# Patient Record
Sex: MALE | Race: WHITE | NOT HISPANIC OR LATINO | Employment: FULL TIME | URBAN - METROPOLITAN AREA
[De-identification: names, ages, dates, MRNs, and addresses within clinical notes are randomized per-mention and may not be internally consistent; named-entity substitution may affect disease eponyms.]

---

## 2018-07-03 ENCOUNTER — LAB REQUISITION (OUTPATIENT)
Dept: LAB | Facility: HOSPITAL | Age: 43
End: 2018-07-03
Payer: COMMERCIAL

## 2018-07-03 DIAGNOSIS — N20.0 CALCULUS OF KIDNEY: ICD-10-CM

## 2018-07-03 PROCEDURE — 82360 CALCULUS ASSAY QUANT: CPT | Performed by: UROLOGY

## 2018-07-12 LAB
CA PHOS MFR STONE: 3 %
CALCIUM OXALATE DIHYDRATE MFR STONE IR: 20 %
COLOR STONE: NORMAL
COM MFR STONE: 77 %
COMMENT-STONE3: NORMAL
COMPOSITION: NORMAL
LABORATORY COMMENT REPORT: NORMAL
NIDUS STONE QL: NORMAL
PHOTO: NORMAL
SIZE STONE: NORMAL MM
STONE ANALYSIS-IMP: NORMAL
STONE ANALYSIS-IMP: NORMAL
SURFACE CRYSTALS: NORMAL
WT STONE: 3.4 MG

## 2019-01-16 ENCOUNTER — APPOINTMENT (OUTPATIENT)
Dept: RADIOLOGY | Facility: MEDICAL CENTER | Age: 44
End: 2019-01-16
Payer: COMMERCIAL

## 2019-01-16 ENCOUNTER — OFFICE VISIT (OUTPATIENT)
Dept: OBGYN CLINIC | Facility: MEDICAL CENTER | Age: 44
End: 2019-01-16
Payer: COMMERCIAL

## 2019-01-16 VITALS
WEIGHT: 257.4 LBS | BODY MASS INDEX: 36.85 KG/M2 | SYSTOLIC BLOOD PRESSURE: 121 MMHG | HEIGHT: 70 IN | HEART RATE: 111 BPM | DIASTOLIC BLOOD PRESSURE: 86 MMHG

## 2019-01-16 DIAGNOSIS — G89.29 CHRONIC LEFT-SIDED LOW BACK PAIN WITH LEFT-SIDED SCIATICA: Primary | ICD-10-CM

## 2019-01-16 DIAGNOSIS — R20.8 NUMBNESS OF LEFT FOOT: ICD-10-CM

## 2019-01-16 DIAGNOSIS — M54.5 LOW BACK PAIN, UNSPECIFIED BACK PAIN LATERALITY, UNSPECIFIED CHRONICITY, WITH SCIATICA PRESENCE UNSPECIFIED: ICD-10-CM

## 2019-01-16 DIAGNOSIS — M54.42 CHRONIC LEFT-SIDED LOW BACK PAIN WITH LEFT-SIDED SCIATICA: Primary | ICD-10-CM

## 2019-01-16 PROCEDURE — 72100 X-RAY EXAM L-S SPINE 2/3 VWS: CPT

## 2019-01-16 PROCEDURE — 99203 OFFICE O/P NEW LOW 30 MIN: CPT | Performed by: EMERGENCY MEDICINE

## 2019-01-16 RX ORDER — OXYCODONE HYDROCHLORIDE AND ACETAMINOPHEN 5; 325 MG/1; MG/1
TABLET ORAL
Qty: 6 TABLET | Refills: 0 | Status: SHIPPED | OUTPATIENT
Start: 2019-01-16 | End: 2019-02-19

## 2019-01-16 RX ORDER — PREDNISONE 20 MG/1
TABLET ORAL
Qty: 18 TABLET | Refills: 0 | Status: SHIPPED | OUTPATIENT
Start: 2019-01-16 | End: 2019-02-19

## 2019-01-16 RX ORDER — METHOCARBAMOL 750 MG/1
TABLET, FILM COATED ORAL
Refills: 0 | COMMUNITY
Start: 2019-01-13 | End: 2019-02-19

## 2019-01-16 NOTE — PROGRESS NOTES
Assessment/Plan:    Diagnoses and all orders for this visit:    Chronic left-sided low back pain with left-sided sciatica  -     XR spine lumbar 2 or 3 views injury; Future  -     predniSONE 20 mg tablet; 1 tab PO TID x 3 days, then 1 tab PO BID x 3 days, then 1 tab PO QD x 3 days  -     oxyCODONE-acetaminophen (PERCOCET) 5-325 mg per tablet; 1-2 tab PO qHS  -     MRI lumbar spine wo contrast; Future    Numbness of left foot  -     oxyCODONE-acetaminophen (PERCOCET) 5-325 mg per tablet; 1-2 tab PO qHS  -     MRI lumbar spine wo contrast; Future         I would like to obtain an MRI of the lumbar spine for patient with severe left lower back pain with radiation down the entire left leg with numbness of the left foot going on for approximately 1 week  He does have a history of chronic back pain stemming from when he was a child  We will place the patient on a prednisone taper we have provided a few days of Percocet ( I did check [place] PDMP) to help him sleep at night  Follow-up after MRI  Unable to do a thorough exam due the patient's pain and apprehension and the inability to sit and relax    Return in about 1 week (around 1/23/2019) for Follow Up After Imaging Study  Chief Complaint:   Back pain and leg pain    Subjective:   Patient ID: Carolee Arora is a 41 y o  male  New patient presents for slightly less than a week left lower back pain with radiation down the leg to the calf along with numbness tingling  He does have a history of sciatica years ago for which she treated with MRI and physical therapy  He states this feels similar  He was seen in the ER for evaluation was given hydrocodone and methocarbamol  Review of Systems   Constitutional: Negative for fever  Respiratory: Negative for shortness of breath  Cardiovascular: Negative for chest pain  Gastrointestinal: Negative for abdominal pain  Musculoskeletal: Positive for back pain  Neurological: Positive for numbness   Negative for weakness  The following portions of the patient's chart were reviewed and updated as appropriate: Allergy:  No Known Allergies      Past Medical History:   Diagnosis Date    Kidney stone        Past Surgical History:   Procedure Laterality Date    KIDNEY STONE SURGERY         Social History     Social History    Marital status: Unknown     Spouse name: N/A    Number of children: N/A    Years of education: N/A     Occupational History    Not on file  Social History Main Topics    Smoking status: Current Every Day Smoker    Smokeless tobacco: Never Used      Comment: VAPE    Alcohol use No    Drug use: Yes     Types: Marijuana    Sexual activity: Not on file     Other Topics Concern    Not on file     Social History Narrative    No narrative on file       Family History   Problem Relation Age of Onset    No Known Problems Mother     No Known Problems Father        Medications:    Current Outpatient Prescriptions:     methocarbamol (ROBAXIN) 750 mg tablet, TAKE 1 TABLET BY MOUTH FOUR TIMES DAILY AS NEEDED FOR MUSCLE SPASMS, Disp: , Rfl: 0    oxyCODONE-acetaminophen (PERCOCET) 5-325 mg per tablet, 1-2 tab PO qHS, Disp: 6 tablet, Rfl: 0    predniSONE 20 mg tablet, 1 tab PO TID x 3 days, then 1 tab PO BID x 3 days, then 1 tab PO QD x 3 days, Disp: 18 tablet, Rfl: 0    There is no problem list on file for this patient  Objective:  Back Exam     Range of Motion   Extension: abnormal   Flexion: abnormal     Other   Toe Walk: normal  Heel Walk: normal  Sensation: decreased  Gait: antalgic     Comments:  Unable to check lower extremity strength and reflexes due to significant pain and inability to sit            Physical Exam   Constitutional: He is oriented to person, place, and time  He appears well-developed and well-nourished  HENT:   Head: Normocephalic and atraumatic  Eyes: Conjunctivae are normal    Neck: Neck supple     Pulmonary/Chest: Effort normal    Neurological: He is alert and oriented to person, place, and time  Skin: Skin is warm and dry  Psychiatric: He has a normal mood and affect  His behavior is normal    Vitals reviewed  Neurologic Exam     Mental Status   Oriented to person, place, and time  Procedures    I have personally reviewed pertinent films in PACS    Disc space seems to be preserved however there is significant curvature to the left

## 2019-01-16 NOTE — PATIENT INSTRUCTIONS
While taking oral steroids (Prednisone, Medrol dose pack) do not take any NSAIDs such as Advil, ibuprofen, Motrin, Aleve or naproxen  You can restart the NSAIDs after you finish the steroids  While taking oral steroids, you may experience mild side effects such as feeling jittery or flushing  Please call if your side effects are significant or you have any questions        Can take Tylenol 500mg every 4 hours as needed

## 2019-01-16 NOTE — PROGRESS NOTES
Assessment/Plan:    Diagnoses and all orders for this visit:    Chronic left-sided low back pain with left-sided sciatica  -     XR spine lumbar 2 or 3 views injury; Future  -     predniSONE 20 mg tablet; 1 tab PO TID x 3 days, then 1 tab PO BID x 3 days, then 1 tab PO QD x 3 days    Other orders  -     methocarbamol (ROBAXIN) 750 mg tablet; TAKE 1 TABLET BY MOUTH FOUR TIMES DAILY AS NEEDED FOR MUSCLE SPASMS        Return in about 1 week (around 1/23/2019)  Chief Complaint:       Subjective:   Patient ID: Charlton Riedel is a 41 y o  male  Onset 1/11/19  NP presents for Left LBP with radiation down leg to calf and ankle along with n/t of left foot        Review of Systems    The following portions of the patient's chart were reviewed and updated as appropriate: Allergy:  No Known Allergies      Past Medical History:   Diagnosis Date    Kidney stone        Past Surgical History:   Procedure Laterality Date    KIDNEY STONE SURGERY         Social History     Social History    Marital status: Unknown     Spouse name: N/A    Number of children: N/A    Years of education: N/A     Occupational History    Not on file       Social History Main Topics    Smoking status: Current Every Day Smoker    Smokeless tobacco: Never Used      Comment: VAPE    Alcohol use No    Drug use: Yes     Types: Marijuana    Sexual activity: Not on file     Other Topics Concern    Not on file     Social History Narrative    No narrative on file       Family History   Problem Relation Age of Onset    No Known Problems Mother     No Known Problems Father        Medications:    Current Outpatient Prescriptions:     methocarbamol (ROBAXIN) 750 mg tablet, TAKE 1 TABLET BY MOUTH FOUR TIMES DAILY AS NEEDED FOR MUSCLE SPASMS, Disp: , Rfl: 0    predniSONE 20 mg tablet, 1 tab PO TID x 3 days, then 1 tab PO BID x 3 days, then 1 tab PO QD x 3 days, Disp: 18 tablet, Rfl: 0    There is no problem list on file for this patient  Objective:  Ortho Exam    Physical Exam      Neurologic Exam    Procedures    I have personally reviewed pertinent films in PACS

## 2019-01-21 ENCOUNTER — TELEPHONE (OUTPATIENT)
Dept: OBGYN CLINIC | Facility: MEDICAL CENTER | Age: 44
End: 2019-01-21

## 2019-01-21 NOTE — TELEPHONE ENCOUNTER
Can you magy patient to see how he is doing? MRI Denied, may do peer to peer however there is no sense in ordering MRI if he is unable to lie down for trhe 40 minutes it takes  Can you ask him if he thinks he would be able to lie down for that long?   Thanks

## 2019-01-21 NOTE — TELEPHONE ENCOUNTER
Spoke w/ pt  He states he was starting to get better but then got worse  He states his foot is still as numb if not more numb than when he was here  He also states his left leg is very stiff  States pain is manageable when taking the Percocet but he would rather not take them  Prednisone did not take away the numbness  He states he would be able to lay down for the MRI  He says he can sit for about 5 mins and walking is difficult  Pt aware peer to peer is scheduled

## 2019-01-21 NOTE — TELEPHONE ENCOUNTER
patient was denied because there was no history of at least 6 weeks of conservative treatment  INS Rep advised that peer to peer can be done if you feel patient really needs MRI  peer to peer set up for 11:30am tomorrow    Thanks

## 2019-01-22 ENCOUNTER — TELEPHONE (OUTPATIENT)
Dept: OBGYN CLINIC | Facility: MEDICAL CENTER | Age: 44
End: 2019-01-22

## 2019-01-22 DIAGNOSIS — M54.42 CHRONIC LEFT-SIDED LOW BACK PAIN WITH LEFT-SIDED SCIATICA: Primary | ICD-10-CM

## 2019-01-22 DIAGNOSIS — R20.8 NUMBNESS OF LEFT FOOT: ICD-10-CM

## 2019-01-22 DIAGNOSIS — G89.29 CHRONIC LEFT-SIDED LOW BACK PAIN WITH LEFT-SIDED SCIATICA: Primary | ICD-10-CM

## 2019-01-25 ENCOUNTER — EVALUATION (OUTPATIENT)
Dept: PHYSICAL THERAPY | Facility: MEDICAL CENTER | Age: 44
End: 2019-01-25
Payer: COMMERCIAL

## 2019-01-25 DIAGNOSIS — G89.29 CHRONIC LEFT-SIDED LOW BACK PAIN WITH LEFT-SIDED SCIATICA: ICD-10-CM

## 2019-01-25 DIAGNOSIS — M54.42 CHRONIC LEFT-SIDED LOW BACK PAIN WITH LEFT-SIDED SCIATICA: ICD-10-CM

## 2019-01-25 DIAGNOSIS — R20.8 NUMBNESS OF LEFT FOOT: ICD-10-CM

## 2019-01-25 PROBLEM — R20.0 NUMBNESS OF LEFT FOOT: Status: ACTIVE | Noted: 2019-01-25

## 2019-01-25 PROCEDURE — 97161 PT EVAL LOW COMPLEX 20 MIN: CPT | Performed by: PHYSICAL THERAPIST

## 2019-01-25 PROCEDURE — 97140 MANUAL THERAPY 1/> REGIONS: CPT | Performed by: PHYSICAL THERAPIST

## 2019-01-25 NOTE — PROGRESS NOTES
PT Evaluation     Today's date: 2019  Patient name: Charlton Riedel  : 1975  MRN: [id number]  Referring provider: Vick Raymond MD  Dx:   Encounter Diagnosis     ICD-10-CM    1  Chronic left-sided low back pain with left-sided sciatica M54 42 Ambulatory referral to Physical Therapy    G89 29    2  Numbness of left foot R20 8 Ambulatory referral to Physical Therapy                  Assessment  Assessment details: Pt presents to PT with signs and symptoms indicative of impingment on the  L4-5; L5-S1 nerve root  Pt demonstrates decreased lumbar ROM (flexion and rotation>extension), decreased core and LE strength, namely DF, PF;  impaired gait with decreased step length/heel strike, impaired posture (lower crossed synrome), impaired biomechanics and movement patterns with transferss, pain with functional activities including ADL's, recreational activities, work-related activities, engaging in social activities, ambulation, lifting/carrying, and transfers  Pt would benefit from skilled physical therapy to address the above impairments and functional limitations to restore prior level of function  Impairments: abnormal coordination, abnormal gait, abnormal muscle firing, abnormal muscle tone, abnormal or restricted ROM, abnormal movement, activity intolerance, impaired balance, impaired physical strength, lacks appropriate home exercise program, pain with function, weight-bearing intolerance and poor body mechanics  Functional limitations: difficulty with prolonged sitting position, supine, Barriers to therapy: Pt isn't sure PT is beneficial - pt stating he had done research on [email or web address] and saw many forums stating that therapy made them worse  Pt expressing interest in discectomy as he read about on internet  Understanding of Dx/Px/POC: good   Prognosis: good    Goals  STG 2 weeks  1  (I) with HEP  2  Decrease pain 25-50% with functional activities  3   Pt will demonstrate good understanding of (I) symptom management  4  Pt with demonstrate >50% centralization  LTG 4-6 weeks  1  Increased AROM >75% all planes  2  Increase strength >4/5 all planes for core and LE  3  Increased FOTO scores >65   4  Return to PLOF including ADL's, recreational activities, work-related activities, engaging in social activities, ambulation, lifting/carrying, transfers  5  Pt will report full centralization of symptoms  Plan  Plan details: Pt would benefit from skilled physical therapy 2x/week weaning to 1x/week over the course of 4-6 weeks to address the above impairments and functional limitations to restore normal movement patterns, abolish pain and return to full, unrestricted activity  D/t high co-pay costs, pt may consider dropping down to 1x/week earlier in the treatment program and joining our fitness program to assist with cost      Patient would benefit from: skilled physical therapy  Planned modality interventions: cryotherapy, ultrasound, TENS, unattended electrical stimulation and thermotherapy: hydrocollator packs  Planned therapy interventions: IADL retraining, joint mobilization, manual therapy, abdominal trunk stabilization, flexibility, functional ROM exercises, gait training, graded activity, graded exercise, home exercise program, work reintegration, therapeutic training, therapeutic exercise, therapeutic activities, stretching, strengthening, patient education, neuromuscular re-education, Corea taping, activity modification and balance/weight bearing training  Frequency: 2x week  Duration in weeks: 6  Treatment plan discussed with: patient        Subjective Evaluation    History of Present Illness  Date of onset: 1/4/2019  Mechanism of injury: History: Pt with chronic LBP with intermittent exacerbation in treatment  Pt with new onset of L leg pain (hip, thigh, knee, calf, foot), paresthesias and numbness (calf and foot) without specific TAYLOR  Pt went to ER early Janurary with f/u to ortho    Pt indicating he has been feeling depressed d/t this injury - pt will be f/u with psychologist this coming week  Pt stating he has not had therapy for previous incident, purchased sock aide on his own to assist with     Testing/Imaging:   XR:There is no evidence of acute fracture or destructive osseous lesion  Mild scoliotic deformity is noted  Alignment is otherwise unremarkable  Intervertebral disc space narrowing at L1/L2, L2/L3 and L5/S1  The pedicles appear intact  Soft tissues are unremarkable  MRI: denied by insurance    Pain & symptom behavior:   Back: 1-2/10 - constant;   L leg pain: 4/10 at rest/pain meds; 7/10 off pain meds  L leg numbness: 7-8/10 (posterior calf/anterior shin and L medial foot)    Aggravating factors: as meds wear off; sitting in computer chair >30mins    Relieving factors: Alternating Acetaminophen with Ibuprofen with fair-good results; sitting in recliner    PMH: kidney stones    Surgical History: kidney stone removal 2017    Occupation: Currently working as [employer] - "light duty"- combination of office work, office maintenance (trash), occasionally driving  Functional Deficits: prolonged positioning, difficulty with prolonged ambulation and gait; recreational activities are limited  Patient Goals:  Pt wishes to decrease pain and numbness, pt wishes to return to full duty work        Objective     Special Questions  Positive for disturbed sleep and kidney problem (kidney stones (+))  Negative for night pain, bladder dysfunction, bowel dysfunction, saddle (S4) numbness, cardiac problem, gallbladder problem, stomach problem, spleen problem and pancreas problem    Palpation     Additional Palpation Details  Pt grossly non-tender with palpation to lumbar paraspinals, QS, obliques    Mild tenderness to L>R psoas    Tenderness     Lumbar Spine  Tenderness in the left transverse process (mild tenderness to L4-5TP)  Left Hip   No tenderness in the ASIS and PSIS       Right Hip   No tenderness in the ASIS and PSIS  Neurological Testing     Sensation     Lumbar   Left   Diminished: light touch    Right   Intact: light touch    Comments   Left light touch: decreaed sensation L calf, lateral foot, plantar aspect of foot    Reflexes   Left   Patellar (L4): trace (1+)  Achilles (S1): trace (1+)  Babinski sign: negative  Clonus sign: negative    Right   Patellar (L4): trace (1+)  Achilles (S1): absent (0)  Babinski sign: negative  Clonus sign: negative    Active Range of Motion     Lumbar   Flexion: 20 degrees   Extension: 15 degrees   Left lateral flexion: 50 degrees   Right lateral flexion: 45 degrees     Additional Active Range of Motion Details  R SB - point tender pain L4-5 PT    Strength/Myotome Testing     Lumbar   Left   Normal strength  Heel walk: abnormal  Toe walk: abnormal    Right   Normal strength    Left Ankle/Foot   Dorsiflexion: 2+  Plantar flexion: 2+ (unable to rise on toes; s/l)  Great toe flexion: 3-  Great toe extension: 3-    Additional Strength Details  Weakness to L4-5; L5-S1 dermatomes  Unable to rise on toes, complete toe walk on s/l- difficulty with heel walk, but able to attempt    Muscle Activation   Patient able to activate left transverse abdominals and left multifidus  Additional Muscle Activation Details  Generalized core weakness - difficulty with contraction and maintaining position with LE movement    Tests       Thoracic   Positive slump  Lumbar   Positive repeated flexion, repeated extension (decreased back pain; 2/10 leg pain) and slumped  Negative prone instability   Left   Positive passive SLR and quadrant  Negative crossed SLR and femoral stretch             Precautions: Chronic LBP with L sciatica/degenerative changes;     Daily Treatment Diary     Manual  1/24            STM l/s, QL, L/s L>R; 10'                                                                    Exercise Diary  1/25            S/l pillow stretch for L QL x10 Pelvic tilts initiated for HEP            Prone press ups x10            Standing trunk extension x10                                                                                                                                                                                                                                Modalities

## 2019-01-28 ENCOUNTER — OFFICE VISIT (OUTPATIENT)
Dept: PHYSICAL THERAPY | Facility: MEDICAL CENTER | Age: 44
End: 2019-01-28
Payer: COMMERCIAL

## 2019-01-28 DIAGNOSIS — M54.42 CHRONIC LEFT-SIDED LOW BACK PAIN WITH LEFT-SIDED SCIATICA: Primary | ICD-10-CM

## 2019-01-28 DIAGNOSIS — R20.8 NUMBNESS OF LEFT FOOT: ICD-10-CM

## 2019-01-28 DIAGNOSIS — G89.29 CHRONIC LEFT-SIDED LOW BACK PAIN WITH LEFT-SIDED SCIATICA: Primary | ICD-10-CM

## 2019-01-28 PROCEDURE — 97110 THERAPEUTIC EXERCISES: CPT | Performed by: PHYSICAL THERAPIST

## 2019-01-28 PROCEDURE — 97112 NEUROMUSCULAR REEDUCATION: CPT | Performed by: PHYSICAL THERAPIST

## 2019-01-28 PROCEDURE — 97010 HOT OR COLD PACKS THERAPY: CPT | Performed by: PHYSICAL THERAPIST

## 2019-01-28 PROCEDURE — 97140 MANUAL THERAPY 1/> REGIONS: CPT | Performed by: PHYSICAL THERAPIST

## 2019-01-29 ENCOUNTER — APPOINTMENT (OUTPATIENT)
Dept: PHYSICAL THERAPY | Facility: MEDICAL CENTER | Age: 44
End: 2019-01-29
Payer: COMMERCIAL

## 2019-01-31 ENCOUNTER — OFFICE VISIT (OUTPATIENT)
Dept: PHYSICAL THERAPY | Facility: MEDICAL CENTER | Age: 44
End: 2019-01-31
Payer: COMMERCIAL

## 2019-01-31 DIAGNOSIS — R20.8 NUMBNESS OF LEFT FOOT: ICD-10-CM

## 2019-01-31 DIAGNOSIS — M54.42 CHRONIC LEFT-SIDED LOW BACK PAIN WITH LEFT-SIDED SCIATICA: Primary | ICD-10-CM

## 2019-01-31 DIAGNOSIS — G89.29 CHRONIC LEFT-SIDED LOW BACK PAIN WITH LEFT-SIDED SCIATICA: Primary | ICD-10-CM

## 2019-01-31 PROCEDURE — 97110 THERAPEUTIC EXERCISES: CPT | Performed by: PHYSICAL THERAPIST

## 2019-01-31 PROCEDURE — 97112 NEUROMUSCULAR REEDUCATION: CPT | Performed by: PHYSICAL THERAPIST

## 2019-01-31 NOTE — PROGRESS NOTES
Daily Note     Today's date: 2019  Patient name: Pablo French  : 1975  MRN: [id number]  Referring provider: Shanice Ward MD  Dx:   Encounter Diagnosis     ICD-10-CM    1  Chronic left-sided low back pain with left-sided sciatica M54 42     G89 29    2  Numbness of left foot R20 8        Start Time: 0800  Stop Time: 0900  Total time in clinic (min): 60 minutes    Subjective: Pt reports feeling "stiff" and a bit sore from "overdoing the exs'  Pt states pain has improved averaging about a 3/10; although no changes in foot numbness, which continues to be 7-8/10 - constant,       Objective: See treatment diary below  Minimal tenderness with palpation  Assessment: Tolerated treatment well without increase in pain with activity  Pt demonstrates some pelvic weakness and decreased core stability with quadruped exercises (namely hip ext), but with cues is able to self correct  Still no change in numbness with exs/treat  Plan: Continue per plan of care  Progress treatment as tolerated            Precautions: Chronic LBP with L sciatica/degenerative changes;     Daily Treatment Diary     Manual            STM l/s, QL, L/s L>R; 10'            Prone press ups  With over pressure x 5'           TPR  L glute med/piriformis; x 5'                                         Exercise Diary            S/l pillow stretch for L QL x10 2mins b/l HEP          Pelvic tilts initiated for HEP With bridge 3x10 With bridge 3x10          Prone press ups x10 With overpressure x 10 Press-up; x20, 10s holds          Standing trunk extension x10 HEP x10          TA brace +ball squeeze  2x10 3x10          TA brace+ER   GTB 2x10 3x10          quadruped  Arms x 10, legs x 10; bird-dog x10 b/l Arms x10; legs 2x10; bird-dog x10          bike  L2 x 10' Lx10'          Ball roll-outs - plank   2x10,5s holds          Side planks   10x 5 s holds; b/l Modalities  1/28            Supine hooklying/ p tx CP x 10

## 2019-02-04 ENCOUNTER — OFFICE VISIT (OUTPATIENT)
Dept: PHYSICAL THERAPY | Facility: MEDICAL CENTER | Age: 44
End: 2019-02-04
Payer: COMMERCIAL

## 2019-02-04 DIAGNOSIS — R20.8 NUMBNESS OF LEFT FOOT: ICD-10-CM

## 2019-02-04 DIAGNOSIS — G89.29 CHRONIC LEFT-SIDED LOW BACK PAIN WITH LEFT-SIDED SCIATICA: Primary | ICD-10-CM

## 2019-02-04 DIAGNOSIS — M54.42 CHRONIC LEFT-SIDED LOW BACK PAIN WITH LEFT-SIDED SCIATICA: Primary | ICD-10-CM

## 2019-02-04 PROCEDURE — 97112 NEUROMUSCULAR REEDUCATION: CPT | Performed by: PHYSICAL THERAPIST

## 2019-02-04 PROCEDURE — 97010 HOT OR COLD PACKS THERAPY: CPT | Performed by: PHYSICAL THERAPIST

## 2019-02-04 NOTE — PROGRESS NOTES
Daily Note     Today's date: 2019  Patient name: Queta Forde  : 1975  MRN: [id number]  Referring provider: Liam Joaquin MD  Dx:   Encounter Diagnosis     ICD-10-CM    1  Chronic left-sided low back pain with left-sided sciatica M54 42     G89 29    2  Numbness of left foot R20 8                   Subjective: Pt reports feeling good after therapy, but then did another set of exercises attempting full planks instead of partial planks which increased back pain and caused calf cramping  PT reiterated instructions to complete stretching 2x/day, strengthening just 1x/day as tolerated and to spread out every other day as needed to manage pain  Pt also reporting starting to get some pain in the toes "hard", not "sharp"  Objective: See treatment diary below  Minimal tenderness with palpation  Assessment: pt still needs cues for technique, but is doing well overall with treatment with noted decrease in pain when performing exercises as prescribed  Pt will need to address work station  Plan: Continue per plan of care  Progress treatment as tolerated            Precautions: Chronic LBP with L sciatica/degenerative changes;     Daily Treatment Diary   30' 1:1    Manual            STM l/s, QL, L/s L>R; 10'            Prone press ups  With over pressure x 5'           TPR  L glute med/piriformis; x 5'                                         Exercise Diary   2/4         S/l pillow stretch for L QL x10 2mins b/l HEP Over tball          Pelvic tilts initiated for HEP With bridge 3x10 With bridge 3x10 With bridge 3x10 +ball squeeze & Tband Er         Prone press ups x10 With overpressure x 10 Press-up; x20, 10s holds x20         Standing trunk extension x10 HEP x10 HEP         TA brace +ball squeeze  2x10 3x10 With bridge         TA brace+ER   GTB 2x10 3x10 With bridge         quadruped  Arms x 10, legs x 10; bird-dog x10 b/l Arms x10; legs 2x10; bird-dog x10 Arms x10; legs 2x10; bird-dog x10         bike  L2 x 10' Lx10' L3x10'         Ball roll-outs - plank   2x10,5s holds HEP         Side planks   10x 5 s holds; b/l HEP         Side stepping with Tband    4x10ft, Green         Mini squats    2x10                                                                                                                     Modalities  1/28 2/4           Supine hooklying/ p tx CP x 10 Cp x 10'

## 2019-02-07 ENCOUNTER — OFFICE VISIT (OUTPATIENT)
Dept: PHYSICAL THERAPY | Facility: MEDICAL CENTER | Age: 44
End: 2019-02-07
Payer: COMMERCIAL

## 2019-02-07 DIAGNOSIS — M54.42 CHRONIC LEFT-SIDED LOW BACK PAIN WITH LEFT-SIDED SCIATICA: Primary | ICD-10-CM

## 2019-02-07 DIAGNOSIS — R20.8 NUMBNESS OF LEFT FOOT: ICD-10-CM

## 2019-02-07 DIAGNOSIS — G89.29 CHRONIC LEFT-SIDED LOW BACK PAIN WITH LEFT-SIDED SCIATICA: Primary | ICD-10-CM

## 2019-02-07 PROCEDURE — 97110 THERAPEUTIC EXERCISES: CPT | Performed by: PHYSICAL THERAPIST

## 2019-02-07 PROCEDURE — 97112 NEUROMUSCULAR REEDUCATION: CPT | Performed by: PHYSICAL THERAPIST

## 2019-02-07 PROCEDURE — 97140 MANUAL THERAPY 1/> REGIONS: CPT | Performed by: PHYSICAL THERAPIST

## 2019-02-07 NOTE — PROGRESS NOTES
Daily Note     Today's date: 2019  Patient name: Pablo French  : 1975  MRN: [id number]  Referring provider: Shanice Ward MD  Dx:   Encounter Diagnosis     ICD-10-CM    1  Chronic left-sided low back pain with left-sided sciatica M54 42     G89 29    2  Numbness of left foot R20 8                   Subjective: Pt continues to c/o 'harness' in the toes - inconsistent with position, time of day, activity; pt also noting little change in function  FOTO scores remain the same  Objective: See treatment diary below  Minimal tenderness with palpation  Assessment: Pt reporting little pain with exercise and activity although still has difficulty with functional activities per his report/FOTO scores  Pt demonstrates improvements in endurance and activity tolerance although little change in function  Plan: Discussion to D/C and f/u with MD d/t lack of change; next visit with review of exercises             Precautions: Chronic LBP with L sciatica/degenerative changes;     Daily Treatment Diary   30' 1:1    Manual            STM l/s, QL, L/s L>R; 10'  +passive QL stretch/MFT 10'          Prone press ups  With over pressure x 5' x10          TPR  L glute med/piriformis; x 5'                                         Exercise Diary  / 2/7        S/l pillow stretch for L QL x10 2mins b/l HEP Over tball  passively        Pelvic tilts initiated for HEP With bridge 3x10 With bridge 3x10 With bridge 3x10 +ball squeeze & Tband Er NP        Prone press ups x10 With overpressure x 10 Press-up; x20, 10s holds x20 x20 (10 overpressure)        Standing trunk extension x10 HEP x10 HEP HEP        TA brace +ball squeeze  2x10 3x10 With bridge 3x10 no bridge        TA brace+ER   GTB 2x10 3x10 With bridge 3x10        quadruped  Arms x 10, legs x 10; bird-dog x10 b/l Arms x10; legs 2x10; bird-dog x10 Arms x10; legs 2x10; bird-dog x10         bike  L2 x 10' Lx10' L3x10' L3x10'        Ball roll-outs - plank   2x10,5s holds HEP HEP        Side planks   10x 5 s holds; b/l HEP HEP        Side stepping with Tband    4x10ft, Green         Mini squats    2x10         Standing diagonals     GTB 2x10 B        TAB+lat+march     GTB 2x10 B                                                                                          Modalities  1/28 2/4 2/7          Supine hooklying/ p tx CP x 10 Cp x 10' Pt deferred

## 2019-02-12 ENCOUNTER — APPOINTMENT (OUTPATIENT)
Dept: PHYSICAL THERAPY | Facility: MEDICAL CENTER | Age: 44
End: 2019-02-12
Payer: COMMERCIAL

## 2019-02-14 ENCOUNTER — APPOINTMENT (OUTPATIENT)
Dept: PHYSICAL THERAPY | Facility: MEDICAL CENTER | Age: 44
End: 2019-02-14
Payer: COMMERCIAL

## 2019-02-15 ENCOUNTER — OFFICE VISIT (OUTPATIENT)
Dept: PHYSICAL THERAPY | Facility: MEDICAL CENTER | Age: 44
End: 2019-02-15
Payer: COMMERCIAL

## 2019-02-15 DIAGNOSIS — M54.42 CHRONIC LEFT-SIDED LOW BACK PAIN WITH LEFT-SIDED SCIATICA: Primary | ICD-10-CM

## 2019-02-15 DIAGNOSIS — G89.29 CHRONIC LEFT-SIDED LOW BACK PAIN WITH LEFT-SIDED SCIATICA: Primary | ICD-10-CM

## 2019-02-15 DIAGNOSIS — R20.8 NUMBNESS OF LEFT FOOT: ICD-10-CM

## 2019-02-15 PROCEDURE — 97110 THERAPEUTIC EXERCISES: CPT | Performed by: PHYSICAL THERAPIST

## 2019-02-15 PROCEDURE — 97112 NEUROMUSCULAR REEDUCATION: CPT | Performed by: PHYSICAL THERAPIST

## 2019-02-15 NOTE — PROGRESS NOTES
Daily Note     Today's date: 2/15/2019  Patient name: Fabienne Donnelly  : 1975  MRN: [id number]  Referring provider: Mandy West MD  Dx:   Encounter Diagnosis     ICD-10-CM    1  Chronic left-sided low back pain with left-sided sciatica M54 42     G89 29    2  Numbness of left foot R20 8                   Subjective: Pt reporting overall much less pain in the back and leg on average 3-4/10; pt continues to have functional deficits with inability to sit >15mins, continued numbness in the L foot with inability to perform DF and great toe ext on the L foot  Pt interested in starting fitness program as we begin weaning from therapy  Objective: See treatment diary below  In WB position, pt unable to lift toes off ground,    Assessment: Pt reporting compliance with HEP, some questions regarding technique  Pt advised to D/C plank progression d/t increased discomfort afterwards - Pt reporting some increase in tingling in the L foot (less numbness) during traction, pt does express some fear in tolerance to new modality  Pt amb with decreased antalgic gait p session  Pt also saking about multivitamins and supplements - pt advised to contact MD      Plan: Pt f/u with MD next Tuesday - Pt indicating he notices much less pain overall and feels stronger with therapy  Pt continues to be limited with lifting the L foot  PT/pf f/u p MD appointment and to see response to mechanical traction               Precautions: Chronic LBP with L sciatica/degenerative changes;     Daily Treatment Diary   30' 1:1    Manual            STM l/s, QL, L/s L>R; 10'  +passive QL stretch/MFT 10'          Prone press ups  With over pressure x 5' x10          TPR  L glute med/piriformis; x 5'                                         Exercise Diary   2/4 2/7 2/15       S/l pillow stretch for L QL x10 2mins b/l HEP Over tball  passively        Pelvic tilts initiated for HEP With bridge 3x10 With bridge 3x10 With bridge 3x10 +ball squeeze & Tband Er NP        Prone press ups x10 With overpressure x 10 Press-up; x20, 10s holds x20 x20 (10 overpressure) x20       Standing trunk extension x10 HEP x10 HEP HEP        TA brace +ball squeeze  2x10 3x10 With bridge 3x10 no bridge review       TA brace+ER   GTB 2x10 3x10 With bridge 3x10 reviewed       quadruped  Arms x 10, legs x 10; bird-dog x10 b/l Arms x10; legs 2x10; bird-dog x10 Arms x10; legs 2x10; bird-dog x10  reviewed       bike  L2 x 10' Lx10' L3x10' L3x10' L3 x 10'       Ball roll-outs - plank   2x10,5s holds HEP HEP reviewed       Side planks   10x 5 s holds; b/l HEP HEP        Side stepping with Tband    4x10ft, Green         Mini squats    2x10         Standing diagonals     GTB 2x10 B revewied       TAB+lat+march     GTB 2x10 B reviewed                                                                                         Modalities  1/28 2/4 2/7 2/15         Supine hooklying/ p tx CP x 10 Cp x 10' Pt deferred CP x 10'         Mechanical Traction    10' 90lbs, 3 step  hookyling

## 2019-02-19 ENCOUNTER — OFFICE VISIT (OUTPATIENT)
Dept: PHYSICAL THERAPY | Facility: MEDICAL CENTER | Age: 44
End: 2019-02-19
Payer: COMMERCIAL

## 2019-02-19 ENCOUNTER — APPOINTMENT (OUTPATIENT)
Dept: LAB | Facility: MEDICAL CENTER | Age: 44
End: 2019-02-19
Payer: COMMERCIAL

## 2019-02-19 ENCOUNTER — OFFICE VISIT (OUTPATIENT)
Dept: OBGYN CLINIC | Facility: MEDICAL CENTER | Age: 44
End: 2019-02-19
Payer: COMMERCIAL

## 2019-02-19 VITALS
WEIGHT: 254 LBS | SYSTOLIC BLOOD PRESSURE: 127 MMHG | HEART RATE: 85 BPM | HEIGHT: 70 IN | BODY MASS INDEX: 36.36 KG/M2 | DIASTOLIC BLOOD PRESSURE: 87 MMHG

## 2019-02-19 DIAGNOSIS — K76.0 FATTY LIVER: ICD-10-CM

## 2019-02-19 DIAGNOSIS — M54.42 CHRONIC LEFT-SIDED LOW BACK PAIN WITH LEFT-SIDED SCIATICA: Primary | ICD-10-CM

## 2019-02-19 DIAGNOSIS — R20.8 NUMBNESS OF LEFT FOOT: ICD-10-CM

## 2019-02-19 DIAGNOSIS — G89.29 CHRONIC LEFT-SIDED LOW BACK PAIN WITH LEFT-SIDED SCIATICA: Primary | ICD-10-CM

## 2019-02-19 DIAGNOSIS — R29.898 ANKLE WEAKNESS: ICD-10-CM

## 2019-02-19 LAB
ALBUMIN SERPL BCP-MCNC: 4.6 G/DL (ref 3.5–5)
ALP SERPL-CCNC: 73 U/L (ref 46–116)
ALT SERPL W P-5'-P-CCNC: 83 U/L (ref 12–78)
ANION GAP SERPL CALCULATED.3IONS-SCNC: 7 MMOL/L (ref 4–13)
AST SERPL W P-5'-P-CCNC: 35 U/L (ref 5–45)
BASOPHILS # BLD AUTO: 0.02 THOUSANDS/ΜL (ref 0–0.1)
BASOPHILS NFR BLD AUTO: 0 % (ref 0–1)
BILIRUB SERPL-MCNC: 0.43 MG/DL (ref 0.2–1)
BUN SERPL-MCNC: 11 MG/DL (ref 5–25)
CALCIUM SERPL-MCNC: 9.3 MG/DL (ref 8.3–10.1)
CHLORIDE SERPL-SCNC: 104 MMOL/L (ref 100–108)
CO2 SERPL-SCNC: 26 MMOL/L (ref 21–32)
CREAT SERPL-MCNC: 0.96 MG/DL (ref 0.6–1.3)
EOSINOPHIL # BLD AUTO: 0.08 THOUSAND/ΜL (ref 0–0.61)
EOSINOPHIL NFR BLD AUTO: 1 % (ref 0–6)
ERYTHROCYTE [DISTWIDTH] IN BLOOD BY AUTOMATED COUNT: 12.2 % (ref 11.6–15.1)
GFR SERPL CREATININE-BSD FRML MDRD: 98 ML/MIN/1.73SQ M
GLUCOSE P FAST SERPL-MCNC: 95 MG/DL (ref 65–99)
HCT VFR BLD AUTO: 47.9 % (ref 36.5–49.3)
HGB BLD-MCNC: 15.7 G/DL (ref 12–17)
IMM GRANULOCYTES # BLD AUTO: 0.01 THOUSAND/UL (ref 0–0.2)
IMM GRANULOCYTES NFR BLD AUTO: 0 % (ref 0–2)
INR PPP: 0.96 (ref 0.86–1.17)
LYMPHOCYTES # BLD AUTO: 3.16 THOUSANDS/ΜL (ref 0.6–4.47)
LYMPHOCYTES NFR BLD AUTO: 50 % (ref 14–44)
MCH RBC QN AUTO: 30.5 PG (ref 26.8–34.3)
MCHC RBC AUTO-ENTMCNC: 32.8 G/DL (ref 31.4–37.4)
MCV RBC AUTO: 93 FL (ref 82–98)
MONOCYTES # BLD AUTO: 0.62 THOUSAND/ΜL (ref 0.17–1.22)
MONOCYTES NFR BLD AUTO: 10 % (ref 4–12)
NEUTROPHILS # BLD AUTO: 2.51 THOUSANDS/ΜL (ref 1.85–7.62)
NEUTS SEG NFR BLD AUTO: 39 % (ref 43–75)
NRBC BLD AUTO-RTO: 0 /100 WBCS
PLATELET # BLD AUTO: 324 THOUSANDS/UL (ref 149–390)
PMV BLD AUTO: 10.5 FL (ref 8.9–12.7)
POTASSIUM SERPL-SCNC: 4.1 MMOL/L (ref 3.5–5.3)
PROT SERPL-MCNC: 7.6 G/DL (ref 6.4–8.2)
PROTHROMBIN TIME: 12.9 SECONDS (ref 11.8–14.2)
RBC # BLD AUTO: 5.15 MILLION/UL (ref 3.88–5.62)
SODIUM SERPL-SCNC: 137 MMOL/L (ref 136–145)
WBC # BLD AUTO: 6.4 THOUSAND/UL (ref 4.31–10.16)

## 2019-02-19 PROCEDURE — 99214 OFFICE O/P EST MOD 30 MIN: CPT | Performed by: EMERGENCY MEDICINE

## 2019-02-19 PROCEDURE — 97112 NEUROMUSCULAR REEDUCATION: CPT | Performed by: PHYSICAL THERAPIST

## 2019-02-19 PROCEDURE — 80053 COMPREHEN METABOLIC PANEL: CPT

## 2019-02-19 PROCEDURE — 97110 THERAPEUTIC EXERCISES: CPT | Performed by: PHYSICAL THERAPIST

## 2019-02-19 PROCEDURE — 85025 COMPLETE CBC W/AUTO DIFF WBC: CPT

## 2019-02-19 PROCEDURE — 36415 COLL VENOUS BLD VENIPUNCTURE: CPT

## 2019-02-19 PROCEDURE — 85610 PROTHROMBIN TIME: CPT

## 2019-02-19 NOTE — PROGRESS NOTES
PT Re-Evaluation  and PT Discharge    Today's date: 2019  Patient name: Johnna Phelps  : 1975  MRN: [id number]  Referring provider: Demar Villalobos MD  Dx:   Encounter Diagnosis     ICD-10-CM    1  Chronic left-sided low back pain with left-sided sciatica M54 42     G89 29    2  Numbness of left foot R20 8        Start Time: 0930  Stop Time: 1020  Total time in clinic (min): 50 minutes    Assessment  Assessment details: Pt overall demonstrates improvements in pain, trunk mobility and strength when performing daily activities  Pts greatest concern is lack of active ankle DF and great toe extension and continued numbness to the L LE  Pt reporting improvements in ability to sit for longer durations, ambulate and perform stairs  Despite improvements, pt does report little improvements in function and little change in numbness in the L LE  PT had recommended f/u with MD which was already scheduled for 19  Pt has returned to MD who ordered an MRI  Initially pt wishing to continue with therapy until MD appointment  Pt also encouraging pt to work with PCP for additional medical, health and wellness concerns  D/t financial concerns and high co-pay, pt has elected to discontinue active treatment with physical therapy, but has enrolled in our fitness program to continue with supervised exercise  PT will keep his encounter open for the next couple weeks to assess tolerance to program and will re-evaluate if necessary  Impairments: abnormal coordination, abnormal gait, abnormal muscle firing, abnormal muscle tone, abnormal or restricted ROM, abnormal movement, activity intolerance, impaired balance, impaired physical strength, lacks appropriate home exercise program, pain with function, weight-bearing intolerance and poor body mechanics  Functional limitations: difficulty with prolonged sitting position, supine, Understanding of Dx/Px/POC: good   Prognosis: good    Goals  STG 2 weeks  1   (I) with HEP (met - pt reporting needs encouragement to complete daily)  2  Decrease pain 25-50% with functional activities (partially met)  3  Pt will demonstrate good understanding of (I) symptom management  (met)  4  Pt with demonstrate >50% centralization  (pain reduction met, numbness - not met)    LTG 4-6 weeks  1  Increased AROM >75% all planes (partially met)  2  Increase strength >4/5 all planes for core and LE (met)  3  Increased FOTO scores >65 (partially met 55)   4  Return to PLOF including ADL's, recreational activities, work-related activities, engaging in social activities, ambulation, lifting/carrying, transfers (grossly limited for all despite improvements)  5  Pt will report full centralization of symptoms  (not met)      Plan  Plan details: Pt has returned to MD who ordered an MRI  Initially pt wishing to continue with therapy until MD appointment  D/t financial concerns and high co-pay, pt has elected to discontinue active treatment with physical therapy, but has enrolled in our fitness program to continue with supervised exercise  PT will keep his encounter open for the next couple weeks to assess tolerance to program and will re-evaluate if necessary        Patient would benefit from: skilled physical therapy  Planned modality interventions: cryotherapy, thermotherapy: hydrocollator packs and traction  Planned therapy interventions: IADL retraining, joint mobilization, manual therapy, abdominal trunk stabilization, flexibility, functional ROM exercises, gait training, graded activity, graded exercise, home exercise program, work reintegration, therapeutic training, therapeutic exercise, therapeutic activities, stretching, strengthening, patient education, neuromuscular re-education, Corea taping, activity modification and balance/weight bearing training  Frequency: 2x week  Duration in weeks: 4  Plan of Care beginning date: 2/19/2019  Plan of Care expiration date: 3/19/2019  Treatment plan discussed with: patient        Subjective Evaluation    History of Present Illness  Date of onset: 1/4/2019  Mechanism of injury: Sarah Tomas reports overall improvements in pain and strength  Grossly 2/10 with most activities  Pt continues to report difficulty with prolonged sitting >30mins, noted stairs at top of stair well with DF/great toe fatigue with completion of activity  Pt also trying weight loss methods including dieting and increasing activity although notes recently he is having difficulty doing so  Pt does not have PCP - therapist encouraging pt to pursue this avenue for more clarity on general health and wellness concerns including vitamin use, prostate concerns etc   MRI is now scheduled for early March  Pt continues to report difficulty with active DF in WB with little great toe extension in standing  Numbness in the lower leg hasn't changed  Pt elected to discontinue mechanical traction at this time  Pain & symptom behavior:   Back: 1-2/10 - constant;   L leg pain: 2/10 pt taking ibuprofen for pain relief  L leg numbness: 7-8/10 (posterior calf/anterior shin and L medial foot)    Aggravating factors: as meds wear off; sitting in computer chair >30mins    Relieving factors: Ibuprofen with fair-good results; sitting in recliner    Occupation: Currently working as [employer] - "light duty"- combination of office work, office maintenance (trash), occasionally driving  Functional Deficits: prolonged positioning, difficulty with prolonged ambulation and gait; recreational activities are limited  Patient Goals:  Pt wishes to decrease pain and numbness, pt wishes to return to full duty work        Objective     Concurrent Complaints  Positive for kidney problem (kidney stones (+))   Negative for night pain, disturbed sleep, bladder dysfunction, bowel dysfunction, saddle (S4) numbness, cardiac problem, gallbladder problem, stomach problem, spleen problem and pancreas problem    Additional Special Questions  No longer experiencing pain at night    Palpation     Additional Palpation Details  Minimal tenderness throughout l/s - QL now  Some pain relief felt with overpressure to l/s during prone press-ups initially although minimal changes at this time  Tenderness     Lumbar Spine  No tenderness in the left transverse process (mild tenderness to L4-5TP)  Left Hip   No tenderness in the ASIS and PSIS  Right Hip   No tenderness in the ASIS and PSIS  Neurological Testing     Sensation     Lumbar   Left   Diminished: light touch    Right   Intact: light touch    Comments   Left light touch: decreaed sensation L calf, lateral foot, plantar aspect of foot    Reflexes   Left   Clonus sign: negative    Right   Clonus sign: negative    Active Range of Motion     Lumbar   Flexion: 40 degrees   Extension: 20 degrees   Left lateral flexion: 50 degrees       Right lateral flexion: 50 degrees     Strength/Myotome Testing     Lumbar   Left   Normal strength  Heel walk: abnormal  Toe walk: abnormal    Right   Normal strength    Left Ankle/Foot   Dorsiflexion: 2+  Plantar flexion: 2+ (unable to rise on toes; s/l)  Great toe flexion: 3-  Great toe extension: 3-    Additional Strength Details  Weakness to L4-5; L5-S1 dermatomes  Unable to rise on toes, complete toe walk on s/l- difficulty with heel walk, but able to attempt  Unable to perform active DF/great toe extension    Muscle Activation   Patient able to activate left transverse abdominals and left multifidus  Additional Muscle Activation Details  Improving abdominal brace and multifdius strength ; Pt able to perform abdominal brace and accept moderate resistance for both abdominals and trunk extensors    Tests     Lumbar     Left   Positive passive SLR and quadrant  Negative crossed SLR and femoral stretch         Precautions: Chronic LBP with L sciatica/degenerative changes;     Daily Treatment Diary   30' 1:1    Manual  1/24 1/28 1/31          STM l/s, QL, L/s L>R; 10'  +passive QL stretch/MFT 10'          Prone press ups  With over pressure x 5' x10          TPR  L glute med/piriformis; x 5'                                         Exercise Diary  1/25 1/28 1/31 2/4 2/7 2/15       S/l pillow stretch for L QL x10 2mins b/l HEP Over tball  passively        Pelvic tilts initiated for HEP With bridge 3x10 With bridge 3x10 With bridge 3x10 +ball squeeze & Tband Er NP        Prone press ups x10 With overpressure x 10 Press-up; x20, 10s holds x20 x20 (10 overpressure) x20       Standing trunk extension x10 HEP x10 HEP HEP        TA brace +ball squeeze  2x10 3x10 With bridge 3x10 no bridge review       TA brace+ER   GTB 2x10 3x10 With bridge 3x10 reviewed       quadruped  Arms x 10, legs x 10; bird-dog x10 b/l Arms x10; legs 2x10; bird-dog x10 Arms x10; legs 2x10; bird-dog x10  reviewed       bike  L2 x 10' Lx10' L3x10' L3x10' L3 x 10'       Ball roll-outs - plank   2x10,5s holds HEP HEP reviewed       Side planks   10x 5 s holds; b/l HEP HEP        Side stepping with Tband    4x10ft, Green         Mini squats    2x10         Standing diagonals     GTB 2x10 B revewied       TAB+lat+march     GTB 2x10 B reviewed                                                                                         Modalities  1/28 2/4 2/7 2/15         Supine hooklying/ p tx CP x 10 Cp x 10' Pt deferred CP x 10'         Mechanical Traction    10' 90lbs, 3 step  hookyling

## 2019-02-26 NOTE — PROGRESS NOTES
Since last visit on 2/15  Pt has not returned to participate in the fitness program at this time  We are discharging this case pending further contact with the pt  Pt has completed 7 PT treatments with good success in relation to strength and pain although little change in numbness  Pt reporting compliance with current HEP  Thank you for this referral  Please contact me if you have questions or concerns regarding Mr Del Valle Rough of care or course of treatment

## 2019-03-07 ENCOUNTER — HOSPITAL ENCOUNTER (OUTPATIENT)
Dept: MRI IMAGING | Facility: HOSPITAL | Age: 44
Discharge: HOME | End: 2019-03-07
Attending: EMERGENCY MEDICINE
Payer: COMMERCIAL

## 2019-03-07 DIAGNOSIS — G89.29 CHRONIC LEFT-SIDED LOW BACK PAIN WITH LEFT-SIDED SCIATICA: ICD-10-CM

## 2019-03-07 DIAGNOSIS — M54.42 CHRONIC LEFT-SIDED LOW BACK PAIN WITH LEFT-SIDED SCIATICA: ICD-10-CM

## 2019-03-07 DIAGNOSIS — R20.8 NUMBNESS OF LEFT FOOT: ICD-10-CM

## 2019-03-07 PROCEDURE — 72148 MRI LUMBAR SPINE W/O DYE: CPT

## 2019-03-08 ENCOUNTER — TELEPHONE (OUTPATIENT)
Dept: OBGYN CLINIC | Facility: HOSPITAL | Age: 44
End: 2019-03-08
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
## 2019-03-09 ENCOUNTER — OFFICE VISIT (OUTPATIENT)
Dept: URGENT CARE | Facility: MEDICAL CENTER | Age: 44
End: 2019-03-09
Payer: COMMERCIAL

## 2019-03-09 VITALS
HEART RATE: 86 BPM | HEIGHT: 70 IN | TEMPERATURE: 98.5 F | BODY MASS INDEX: 35.79 KG/M2 | RESPIRATION RATE: 16 BRPM | DIASTOLIC BLOOD PRESSURE: 86 MMHG | SYSTOLIC BLOOD PRESSURE: 153 MMHG | WEIGHT: 250 LBS | OXYGEN SATURATION: 98 %

## 2019-03-09 DIAGNOSIS — M54.50 ACUTE BILATERAL LOW BACK PAIN WITHOUT SCIATICA: Primary | ICD-10-CM

## 2019-03-09 PROCEDURE — G0382 LEV 3 HOSP TYPE B ED VISIT: HCPCS | Performed by: PHYSICIAN ASSISTANT

## 2019-03-09 RX ORDER — METHOCARBAMOL 500 MG/1
500 TABLET, FILM COATED ORAL 4 TIMES DAILY
Qty: 20 TABLET | Refills: 0 | Status: SHIPPED | OUTPATIENT
Start: 2019-03-09 | End: 2019-05-20

## 2019-03-09 NOTE — PATIENT INSTRUCTIONS
Low back pain  Robaxin as directed - may become drowsy  Follow up with PCP in 3-5 days  Proceed to  ER if symptoms worsen  Acute Low Back Pain   WHAT YOU NEED TO KNOW:   Acute low back pain is sudden discomfort in your lower back area that lasts for up to 6 weeks  The discomfort makes it difficult to tolerate activity  DISCHARGE INSTRUCTIONS:   Return to the emergency department if:   · You have severe pain  · You have sudden stiffness and heaviness on both buttocks down to both legs  · You have numbness or weakness in one leg, or pain in both legs  · You have numbness in your genital area or across your lower back  · You cannot control your urine or bowel movements  Contact your healthcare provider if:   · You have a fever  · You have pain at night or when you rest     · Your pain does not get better with treatment  · You have pain that worsens when you cough or sneeze  · You suddenly feel something pop or snap in your back  · You have questions or concerns about your condition or care  Medicines: The following medicines may be ordered by your healthcare provider:  · Acetaminophen  decreases pain  It is available without a doctor's order  Ask how much to take and how often to take it  Follow directions  Acetaminophen can cause liver damage if not taken correctly  · NSAIDs  help decrease swelling and pain  This medicine is available with or without a doctor's order  NSAIDs can cause stomach bleeding or kidney problems in certain people  If you take blood thinner medicine, always ask your healthcare provider if NSAIDs are safe for you  Always read the medicine label and follow directions  · Prescription pain medicine  may be given  Ask your healthcare provider how to take this medicine safely  · Muscle relaxers  decrease pain by relaxing the muscles in your lower spine  · Take your medicine as directed    Contact your healthcare provider if you think your medicine is not helping or if you have side effects  Tell him of her if you are allergic to any medicine  Keep a list of the medicines, vitamins, and herbs you take  Include the amounts, and when and why you take them  Bring the list or the pill bottles to follow-up visits  Carry your medicine list with you in case of an emergency  Self-care:   · Stay active  as much as you can without causing more pain  Bed rest could make your back pain worse  Start with some light exercises such as walking  Avoid heavy lifting until your pain is gone  Ask for more information about the activities or exercises that are right for you  · Ice  helps decrease swelling, pain, and muscle spams  Put crushed ice in a plastic bag  Cover it with a towel  Place it on your lower back for 20 to 30 minutes every 2 hours  Do this for about 2 to 3 days after your pain starts, or as directed  · Heat  helps decrease pain and muscle spasms  Start to use heat after treatment with ice has stopped  Use a small towel dampened with warm water or a heating pad, or sit in a warm bath  Apply heat on the area for 20 to 30 minutes every 2 hours for as many days as directed  Alternate heat and ice  Prevent acute low back pain:   · Use proper body mechanics  ¨ Bend at the hips and knees when you  objects  Do not bend from the waist  Use your leg muscles as you lift the load  Do not use your back  Keep the object close to your chest as you lift it  Try not to twist or lift anything above your waist     ¨ Change your position often when you stand for long periods of time  Rest one foot on a small box or footrest, and then switch to the other foot often  ¨ Try not to sit for long periods of time  When you do, sit in a straight-backed chair with your feet flat on the floor  Never reach, pull, or push while you are sitting  · Do exercises that strengthen your back muscles  Warm up before you exercise   Ask your healthcare provider the best exercises for you     · Maintain a healthy weight  Ask your healthcare provider how much you should weigh  Ask him to help you create a weight loss plan if you are overweight  Follow up with your healthcare provider as directed:  Return for a follow-up visit if you still have pain after 1 to 3 weeks of treatment  You may need to visit an orthopedist if your back pain lasts more than 12 weeks  Write down your questions so you remember to ask them during your visits  © 2017 [employer] Information is for End User's use only and may not be sold, redistributed or otherwise used for commercial purposes  All illustrations and images included in CareNotes® are the copyrighted property of [employer]  or Jit[employer]  The above information is an  only  It is not intended as medical advice for individual conditions or treatments  Talk to your doctor, nurse or pharmacist before following any medical regimen to see if it is safe and effective for you

## 2019-03-12 ENCOUNTER — OFFICE VISIT (OUTPATIENT)
Dept: OBGYN CLINIC | Facility: MEDICAL CENTER | Age: 44
End: 2019-03-12
Payer: COMMERCIAL

## 2019-03-12 VITALS
HEART RATE: 102 BPM | WEIGHT: 250 LBS | HEIGHT: 70 IN | DIASTOLIC BLOOD PRESSURE: 84 MMHG | BODY MASS INDEX: 35.79 KG/M2 | SYSTOLIC BLOOD PRESSURE: 172 MMHG

## 2019-03-12 DIAGNOSIS — M51.26 LUMBAR DISC HERNIATION: ICD-10-CM

## 2019-03-12 DIAGNOSIS — R29.898 ANKLE WEAKNESS: ICD-10-CM

## 2019-03-12 DIAGNOSIS — M54.42 CHRONIC LEFT-SIDED LOW BACK PAIN WITH LEFT-SIDED SCIATICA: Primary | ICD-10-CM

## 2019-03-12 DIAGNOSIS — G89.29 CHRONIC LEFT-SIDED LOW BACK PAIN WITH LEFT-SIDED SCIATICA: Primary | ICD-10-CM

## 2019-03-12 PROCEDURE — 99213 OFFICE O/P EST LOW 20 MIN: CPT | Performed by: EMERGENCY MEDICINE
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
## 2019-03-13 ENCOUNTER — OFFICE VISIT (OUTPATIENT)
Dept: FAMILY MEDICINE CLINIC | Facility: CLINIC | Age: 44
End: 2019-03-13
Payer: COMMERCIAL

## 2019-03-13 VITALS
BODY MASS INDEX: 36.59 KG/M2 | SYSTOLIC BLOOD PRESSURE: 142 MMHG | HEART RATE: 76 BPM | WEIGHT: 255.6 LBS | RESPIRATION RATE: 16 BRPM | DIASTOLIC BLOOD PRESSURE: 90 MMHG | HEIGHT: 70 IN | OXYGEN SATURATION: 98 % | TEMPERATURE: 97.6 F

## 2019-03-13 DIAGNOSIS — K76.0 FATTY LIVER: ICD-10-CM

## 2019-03-13 DIAGNOSIS — F41.9 ANXIETY: Primary | ICD-10-CM

## 2019-03-13 DIAGNOSIS — E55.9 VITAMIN D DEFICIENCY: ICD-10-CM

## 2019-03-13 DIAGNOSIS — F33.0 MILD EPISODE OF RECURRENT MAJOR DEPRESSIVE DISORDER (HCC): ICD-10-CM

## 2019-03-13 DIAGNOSIS — I10 ESSENTIAL HYPERTENSION: ICD-10-CM

## 2019-03-13 PROCEDURE — 3008F BODY MASS INDEX DOCD: CPT | Performed by: FAMILY MEDICINE

## 2019-03-13 PROCEDURE — 93000 ELECTROCARDIOGRAM COMPLETE: CPT | Performed by: FAMILY MEDICINE

## 2019-03-13 PROCEDURE — 99204 OFFICE O/P NEW MOD 45 MIN: CPT | Performed by: FAMILY MEDICINE
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
## 2019-03-25 ENCOUNTER — TELEPHONE (OUTPATIENT)
Dept: OBGYN CLINIC | Facility: HOSPITAL | Age: 44
End: 2019-03-25

## 2019-03-26 NOTE — TELEPHONE ENCOUNTER
Called [facility] farm and spoke with Lachelle Zaragoza open -Medical Benefits Available

## 2019-04-23 ENCOUNTER — TELEPHONE (OUTPATIENT)
Dept: RADIOLOGY | Facility: CLINIC | Age: 44
End: 2019-04-23

## 2019-05-20 ENCOUNTER — CONSULT (OUTPATIENT)
Dept: PAIN MEDICINE | Facility: CLINIC | Age: 44
End: 2019-05-20
Payer: COMMERCIAL

## 2019-05-20 VITALS — DIASTOLIC BLOOD PRESSURE: 82 MMHG | TEMPERATURE: 98.6 F | HEART RATE: 77 BPM | SYSTOLIC BLOOD PRESSURE: 138 MMHG

## 2019-05-20 DIAGNOSIS — M51.16 INTERVERTEBRAL DISC DISORDER WITH RADICULOPATHY OF LUMBAR REGION: ICD-10-CM

## 2019-05-20 PROCEDURE — 99204 OFFICE O/P NEW MOD 45 MIN: CPT | Performed by: ANESTHESIOLOGY

## 2019-07-24 ENCOUNTER — TELEPHONE (OUTPATIENT)
Dept: PAIN MEDICINE | Facility: MEDICAL CENTER | Age: 44
End: 2019-07-24

## 2019-07-24 DIAGNOSIS — M51.16 INTERVERTEBRAL DISC DISORDER WITH RADICULOPATHY OF LUMBAR REGION: Primary | ICD-10-CM

## 2019-07-24 NOTE — TELEPHONE ENCOUNTER
S/w pt, he is asking to schedule a procedure that was offered at his consult in May, L L4-5 TFESI  Pt said he is now looking to move forward because symptoms have not improved  Pt said he pain and numbness is in the sciatica and radiates down his L leg  Informed pt that if that injection is still the plan, FQ will place order and our  will contact him to schedule

## 2019-07-24 NOTE — TELEPHONE ENCOUNTER
Pt called and stated he was in to see  The Dr a few months ago but was not ready at the time to schedule a procedure  Pt has stated that he is now ready and the pain has not subsided   Pt would like to know if he could schedule a procedure      Pt can be reached at [phone]

## 2019-09-03 ENCOUNTER — HOSPITAL ENCOUNTER (OUTPATIENT)
Dept: RADIOLOGY | Facility: CLINIC | Age: 44
Discharge: HOME | End: 2019-09-03
Attending: ANESTHESIOLOGY | Admitting: ANESTHESIOLOGY
Payer: COMMERCIAL

## 2019-09-03 VITALS
HEART RATE: 92 BPM | RESPIRATION RATE: 18 BRPM | SYSTOLIC BLOOD PRESSURE: 161 MMHG | OXYGEN SATURATION: 98 % | TEMPERATURE: 98.2 F | DIASTOLIC BLOOD PRESSURE: 94 MMHG

## 2019-09-03 DIAGNOSIS — M51.16 INTERVERTEBRAL DISC DISORDER WITH RADICULOPATHY OF LUMBAR REGION: ICD-10-CM

## 2019-09-03 PROCEDURE — 64483 NJX AA&/STRD TFRM EPI L/S 1: CPT | Performed by: ANESTHESIOLOGY

## 2019-09-03 PROCEDURE — 64484 NJX AA&/STRD TFRM EPI L/S EA: CPT | Performed by: ANESTHESIOLOGY

## 2019-09-03 RX ORDER — METHYLPREDNISOLONE ACETATE 80 MG/ML
80 INJECTION, SUSPENSION INTRA-ARTICULAR; INTRALESIONAL; INTRAMUSCULAR; PARENTERAL; SOFT TISSUE ONCE
Status: COMPLETED | OUTPATIENT
Start: 2019-09-03 | End: 2019-09-03

## 2019-09-03 RX ORDER — BUPIVACAINE HCL/PF 2.5 MG/ML
10 VIAL (ML) INJECTION ONCE
Status: COMPLETED | OUTPATIENT
Start: 2019-09-03 | End: 2019-09-03

## 2019-09-03 RX ORDER — 0.9 % SODIUM CHLORIDE 0.9 %
10 VIAL (ML) INJECTION ONCE
Status: COMPLETED | OUTPATIENT
Start: 2019-09-03 | End: 2019-09-03

## 2019-09-03 RX ADMIN — METHYLPREDNISOLONE ACETATE 80 MG: 80 INJECTION, SUSPENSION INTRA-ARTICULAR; INTRALESIONAL; INTRAMUSCULAR; PARENTERAL; SOFT TISSUE at 12:00

## 2019-09-03 RX ADMIN — BUPIVACAINE HYDROCHLORIDE 2 ML: 2.5 INJECTION, SOLUTION EPIDURAL; INFILTRATION; INTRACAUDAL at 12:00

## 2019-09-03 RX ADMIN — SODIUM CHLORIDE 5 ML: 9 INJECTION, SOLUTION INTRAMUSCULAR; INTRAVENOUS; SUBCUTANEOUS at 11:58

## 2019-09-03 RX ADMIN — IOHEXOL 1 ML: 300 INJECTION, SOLUTION INTRAVENOUS at 12:00

## 2019-09-03 RX ADMIN — Medication 5 ML: at 11:58

## 2019-09-03 NOTE — H&P
History of Present Illness: The patient is a 45 y o  male who presents with complaints of left lower back and leg pain secondary to lumbar bulging disc and is here today for left L4 left L5 transforaminal epidural steroid injection  Patient Active Problem List   Diagnosis    Chronic left-sided low back pain with left-sided sciatica    Numbness of left foot    Intervertebral disc disorder with radiculopathy of lumbar region       Past Medical History:   Diagnosis Date    Back pain     Kidney stone        Past Surgical History:   Procedure Laterality Date    KIDNEY STONE SURGERY           Current Outpatient Medications:     Acetaminophen (TYLENOL PO), Take by mouth, Disp: , Rfl:     IBUPROFEN PO, Take by mouth, Disp: , Rfl:   No current facility-administered medications for this encounter  No Known Allergies    Physical Exam:   Vitals:    09/03/19 1205   BP: 161/94   Pulse: 92   Resp: 18   Temp:    SpO2: 98%     General: Awake, Alert, Oriented x 3, Mood and affect appropriate  Respiratory: Respirations even and unlabored  Cardiovascular: Peripheral pulses intact; no edema  Musculoskeletal Exam:   Left lower back tenderness    ASA Score: 2    Patient/Chart Verification  Patient ID Verified: Verbal  Consents Confirmed: To be obtained in the Pre-Procedure area  H&P( within 30 days) Verified: To be obtained in the Pre-Procedure area  Allergies Reviewed: Yes  Anticoag/NSAID held?: NA  Currently on antibiotics?: No    Assessment:   1   Intervertebral disc disorder with radiculopathy of lumbar region        Plan: Left L4-5 TF ORA

## 2019-09-03 NOTE — DISCHARGE INSTR - LAB
Epidural Steroid Injection   WHAT YOU NEED TO KNOW:   An epidural steroid injection (ORA) is a procedure to inject steroid medicine into the epidural space  The epidural space is between your spinal cord and vertebrae  Steroids reduce inflammation and fluid buildup in your spine that may be causing pain  You may be given pain medicine along with the steroids  ACTIVITY  · Do not drive or operate machinery today  · No strenuous activity today - bending, lifting, etc   · You may resume normal activites starting tomorrow - start slowly and as tolerated  · You may shower today, but no tub baths or hot tubs  · You may have numbness for several hours from the local anesthetic  Please use caution and common sense, especially with weight-bearing activities  CARE OF THE INJECTION SITE  · If you have soreness or pain, apply ice to the area today (20 minutes on/20 minutes off)  · Starting tomorrow, you may use warm, moist heat or ice if needed  · You may have an increase or change in your discomfort for 36-48 hours after your treatment  · Apply ice and continue with any pain medication you have been prescribed  · Notify [facility] if you have any of the following: redness, drainage, swelling, headache, stiff neck or fever above 100°F     SPECIAL INSTRUCTIONS  · Our office will contact you in approximately 7 days for a progress report  MEDICATIONS  · Continue to take all routine medications  · Our office may have instructed you to hold some medications  If you have a problem specifically related to your procedure, please call our office at [phone]  Problems not related to your procedure should be directed to your primary care physician

## 2019-09-10 ENCOUNTER — TELEPHONE (OUTPATIENT)
Dept: PAIN MEDICINE | Facility: CLINIC | Age: 44
End: 2019-09-10

## 2019-09-16 NOTE — TELEPHONE ENCOUNTER
Pt states injection did not help  Not at all    Pain level: 3-4  Percentage of relief: 0%    Pt is asking if the procedure can cause dizziness? He has been dizzy the last couple of day  It comes and goes  Please advise   Call back# [phone]

## 2019-09-16 NOTE — TELEPHONE ENCOUNTER
Would recommend repeating injection for further efficacy  If amenable, I will place order  No injection would not cause dizziness

## 2019-09-17 NOTE — TELEPHONE ENCOUNTER
S/w pt, offered him below  Pt said he would not like to repeat the injection  Offered him a follow up appt, pt denied  Informed pt that the injection would not cause dizziness, he replied "I'm sure"  Asked pt if he had any further questions or concerns, pt denied

## 2019-09-23 ENCOUNTER — APPOINTMENT (OUTPATIENT)
Dept: LAB | Facility: MEDICAL CENTER | Age: 44
End: 2019-09-23
Payer: COMMERCIAL

## 2019-09-23 ENCOUNTER — OFFICE VISIT (OUTPATIENT)
Dept: FAMILY MEDICINE CLINIC | Facility: CLINIC | Age: 44
End: 2019-09-23
Payer: COMMERCIAL

## 2019-09-23 VITALS
HEIGHT: 70 IN | BODY MASS INDEX: 36.51 KG/M2 | DIASTOLIC BLOOD PRESSURE: 78 MMHG | TEMPERATURE: 97.8 F | OXYGEN SATURATION: 95 % | SYSTOLIC BLOOD PRESSURE: 122 MMHG | WEIGHT: 255 LBS | HEART RATE: 90 BPM

## 2019-09-23 DIAGNOSIS — I10 ESSENTIAL HYPERTENSION: ICD-10-CM

## 2019-09-23 DIAGNOSIS — R42 EPISODIC LIGHTHEADEDNESS: Primary | ICD-10-CM

## 2019-09-23 DIAGNOSIS — R42 EPISODIC LIGHTHEADEDNESS: ICD-10-CM

## 2019-09-23 LAB
25(OH)D3 SERPL-MCNC: 38.7 NG/ML (ref 30–100)
ALBUMIN SERPL BCP-MCNC: 4.3 G/DL (ref 3.5–5)
ALP SERPL-CCNC: 70 U/L (ref 46–116)
ALT SERPL W P-5'-P-CCNC: 83 U/L (ref 12–78)
ANION GAP SERPL CALCULATED.3IONS-SCNC: 5 MMOL/L (ref 4–13)
AST SERPL W P-5'-P-CCNC: 30 U/L (ref 5–45)
BASOPHILS # BLD AUTO: 0.02 THOUSANDS/ΜL (ref 0–0.1)
BASOPHILS NFR BLD AUTO: 0 % (ref 0–1)
BILIRUB SERPL-MCNC: 0.53 MG/DL (ref 0.2–1)
BUN SERPL-MCNC: 14 MG/DL (ref 5–25)
CALCIUM SERPL-MCNC: 9.2 MG/DL (ref 8.3–10.1)
CHLORIDE SERPL-SCNC: 105 MMOL/L (ref 100–108)
CHOLEST SERPL-MCNC: 185 MG/DL (ref 50–200)
CO2 SERPL-SCNC: 26 MMOL/L (ref 21–32)
CREAT SERPL-MCNC: 1.02 MG/DL (ref 0.6–1.3)
EOSINOPHIL # BLD AUTO: 0.12 THOUSAND/ΜL (ref 0–0.61)
EOSINOPHIL NFR BLD AUTO: 1 % (ref 0–6)
ERYTHROCYTE [DISTWIDTH] IN BLOOD BY AUTOMATED COUNT: 12.6 % (ref 11.6–15.1)
FERRITIN SERPL-MCNC: 255 NG/ML (ref 8–388)
GFR SERPL CREATININE-BSD FRML MDRD: 90 ML/MIN/1.73SQ M
GLUCOSE SERPL-MCNC: 90 MG/DL (ref 65–140)
HCT VFR BLD AUTO: 50.2 % (ref 36.5–49.3)
HDLC SERPL-MCNC: 34 MG/DL (ref 40–60)
HGB BLD-MCNC: 16.6 G/DL (ref 12–17)
IMM GRANULOCYTES # BLD AUTO: 0.02 THOUSAND/UL (ref 0–0.2)
IMM GRANULOCYTES NFR BLD AUTO: 0 % (ref 0–2)
LDLC SERPL CALC-MCNC: 119 MG/DL (ref 0–100)
LYMPHOCYTES # BLD AUTO: 3.57 THOUSANDS/ΜL (ref 0.6–4.47)
LYMPHOCYTES NFR BLD AUTO: 39 % (ref 14–44)
MCH RBC QN AUTO: 30 PG (ref 26.8–34.3)
MCHC RBC AUTO-ENTMCNC: 33.1 G/DL (ref 31.4–37.4)
MCV RBC AUTO: 91 FL (ref 82–98)
MONOCYTES # BLD AUTO: 0.77 THOUSAND/ΜL (ref 0.17–1.22)
MONOCYTES NFR BLD AUTO: 8 % (ref 4–12)
NEUTROPHILS # BLD AUTO: 4.68 THOUSANDS/ΜL (ref 1.85–7.62)
NEUTS SEG NFR BLD AUTO: 52 % (ref 43–75)
NONHDLC SERPL-MCNC: 151 MG/DL
NRBC BLD AUTO-RTO: 0 /100 WBCS
PLATELET # BLD AUTO: 302 THOUSANDS/UL (ref 149–390)
PMV BLD AUTO: 10.3 FL (ref 8.9–12.7)
POTASSIUM SERPL-SCNC: 3.9 MMOL/L (ref 3.5–5.3)
PROT SERPL-MCNC: 7.9 G/DL (ref 6.4–8.2)
RBC # BLD AUTO: 5.53 MILLION/UL (ref 3.88–5.62)
SODIUM SERPL-SCNC: 136 MMOL/L (ref 136–145)
TRIGL SERPL-MCNC: 160 MG/DL
TSH SERPL DL<=0.05 MIU/L-ACNC: 3.65 UIU/ML (ref 0.36–3.74)
VIT B12 SERPL-MCNC: 769 PG/ML (ref 100–900)
WBC # BLD AUTO: 9.18 THOUSAND/UL (ref 4.31–10.16)

## 2019-09-23 PROCEDURE — 82728 ASSAY OF FERRITIN: CPT

## 2019-09-23 PROCEDURE — 85025 COMPLETE CBC W/AUTO DIFF WBC: CPT

## 2019-09-23 PROCEDURE — 99214 OFFICE O/P EST MOD 30 MIN: CPT | Performed by: FAMILY MEDICINE

## 2019-09-23 PROCEDURE — 3008F BODY MASS INDEX DOCD: CPT | Performed by: FAMILY MEDICINE

## 2019-09-23 PROCEDURE — 80061 LIPID PANEL: CPT

## 2019-09-23 PROCEDURE — 82607 VITAMIN B-12: CPT

## 2019-09-23 PROCEDURE — 80053 COMPREHEN METABOLIC PANEL: CPT

## 2019-09-23 PROCEDURE — 36415 COLL VENOUS BLD VENIPUNCTURE: CPT

## 2019-09-23 PROCEDURE — 82306 VITAMIN D 25 HYDROXY: CPT

## 2019-09-23 PROCEDURE — 84443 ASSAY THYROID STIM HORMONE: CPT

## 2019-09-23 PROCEDURE — 86618 LYME DISEASE ANTIBODY: CPT

## 2019-09-23 NOTE — PROGRESS NOTES
BMI Counseling: Body mass index is 36 59 kg/m²  The BMI is above normal  Nutrition recommendations include reducing portion sizes, 3-5 servings of fruits/vegetables daily, consuming healthier snacks, decreasing soda and/or juice intake and increasing intake of lean protein  Exercise recommendations include moderate aerobic physical activity for 150 minutes/week and strength training exercises  Assessment/Plan:         Diagnoses and all orders for this visit:    Episodic lightheadedness  Comments:  cannot r/o anxiety, r/o metabolic, iatrogenic, viral?  Orders:  -     CBC and differential; Future  -     Comprehensive metabolic panel; Future  -     TSH, 3rd generation; Future  -     Lyme Antibody Profile with reflex to WB; Future  -     Ferritin; Future  -     Vitamin B12; Future          Subjective:      Patient ID: Claudio Lopez is a 45 y o  male  Feeling lightheaded, known kidney stone, used to be embedded in the kidney but now feels like it may be bouncing around in the kidney  Feverish at times  Started about 2-3 weeks ago, no congestion  No meds  Has always struggled with anxiety, not sure if sx are related to anxiety, his back pain, his kidney stones  At the time sx began, he had also stopped CBD cards that he uses for pain and anxiety  Steroid shot before the sx developed for his back issues  The following portions of the patient's history were reviewed and updated as appropriate: allergies, current medications, past family history, past medical history, past social history, past surgical history and problem list     Review of Systems   Constitutional: Positive for fever  Negative for activity change  HENT: Negative for congestion, ear pain, postnasal drip, rhinorrhea, sneezing and tinnitus  Respiratory: Negative for chest tightness and shortness of breath  Cardiovascular: Negative for chest pain and palpitations  Genitourinary: Positive for flank pain   Negative for difficulty urinating, dysuria, hematuria and urgency  Musculoskeletal: Positive for back pain (sciatic pain)  Neurological: Positive for light-headedness and numbness (neuropathy in the LLE)  Negative for dizziness and headaches  Psychiatric/Behavioral: Positive for confusion  The patient is nervous/anxious  Objective:      /78 (BP Location: Right arm, Patient Position: Sitting, Cuff Size: Large)   Pulse 90   Temp 97 8 °F (36 6 °C)   Ht 5' 10" (1 778 m)   Wt 116 kg (255 lb)   SpO2 95%   BMI 36 59 kg/m²          Physical Exam   Constitutional: He is oriented to person, place, and time  He appears well-developed and well-nourished  HENT:   Right Ear: External ear normal    Left Ear: External ear normal    Nose: Nose normal    Mouth/Throat: Oropharynx is clear and moist    Eyes: Pupils are equal, round, and reactive to light  Conjunctivae and EOM are normal    Neck: Normal range of motion  No thyromegaly present  Cardiovascular: Normal rate, regular rhythm and normal heart sounds  Pulmonary/Chest: Effort normal and breath sounds normal    Musculoskeletal: Normal range of motion  He exhibits no tenderness  Lymphadenopathy:     He has no cervical adenopathy  Neurological: He is alert and oriented to person, place, and time  No cranial nerve deficit  He exhibits normal muscle tone  Coordination normal    Skin: Skin is warm  Psychiatric: He has a normal mood and affect  His behavior is normal  Judgment and thought content normal    Vitals reviewed

## 2019-09-24 LAB — B BURGDOR IGG+IGM SER-ACNC: <0.91 ISR (ref 0–0.9)

## 2019-09-30 ENCOUNTER — TELEPHONE (OUTPATIENT)
Dept: FAMILY MEDICINE CLINIC | Facility: CLINIC | Age: 44
End: 2019-09-30

## 2019-09-30 NOTE — TELEPHONE ENCOUNTER
Pt called asking how comprehensive the blood tests are  If labs came back normal, per VM he stated he received, what would his next step be to find out what is wrong? Any other test that can be run? Pt can be reached on cell #

## 2019-10-02 ENCOUNTER — TELEPHONE (OUTPATIENT)
Dept: FAMILY MEDICINE CLINIC | Facility: CLINIC | Age: 44
End: 2019-10-02

## 2019-10-02 NOTE — TELEPHONE ENCOUNTER
Called & relayed message to pt  He will call back to schedule appt  Works nights & had just gotten to bed

## 2019-10-02 NOTE — TELEPHONE ENCOUNTER
The tests do not rule out everything, we can only test so much at one time  We can look into cardiovascular causes  He can come back and we can check an EKG, perhaps order echo/carotid study for his lightheadedness  Have other symptoms developed at this point?

## 2019-11-08 ENCOUNTER — TELEPHONE (OUTPATIENT)
Dept: UROLOGY | Facility: MEDICAL CENTER | Age: 44
End: 2019-11-08

## 2019-11-08 NOTE — TELEPHONE ENCOUNTER
Patient has a history if kidney stones, previously treated with [facility] and had lithotripsy done about two years ago    Called and spoke with patient at this time, he reports stabbing left flank paintimes two weeks  He reports 'it's not inbearable but I need to get established and get this taken care of ' Agreed with patient   Appointment made for 11/18/19 at 230pm at the [facility] office with [facility]

## 2019-11-08 NOTE — TELEPHONE ENCOUNTER
New patient  Please triage  Reason for call: kidney stone/flank pain  Insurance:  [employer] PPO  Patient records:  Patient states he has not been treated for these stones in over 2 years  Pt believes he was seen by [facility] and does not recall where  Patient requesting an appointment at [facility] office  No personal history of cancer

## 2019-11-18 ENCOUNTER — CONSULT (OUTPATIENT)
Dept: UROLOGY | Facility: CLINIC | Age: 44
End: 2019-11-18
Payer: COMMERCIAL

## 2019-11-18 VITALS
HEART RATE: 87 BPM | HEIGHT: 70 IN | WEIGHT: 262 LBS | BODY MASS INDEX: 37.51 KG/M2 | DIASTOLIC BLOOD PRESSURE: 68 MMHG | SYSTOLIC BLOOD PRESSURE: 132 MMHG

## 2019-11-18 DIAGNOSIS — R10.9 FLANK PAIN: Primary | ICD-10-CM

## 2019-11-18 DIAGNOSIS — Z87.442 HISTORY OF KIDNEY STONES: ICD-10-CM

## 2019-11-18 LAB
SL AMB  POCT GLUCOSE, UA: NORMAL
SL AMB LEUKOCYTE ESTERASE,UA: NORMAL
SL AMB POCT BILIRUBIN,UA: NORMAL
SL AMB POCT BLOOD,UA: NORMAL
SL AMB POCT CLARITY,UA: CLEAR
SL AMB POCT COLOR,UA: YELLOW
SL AMB POCT KETONES,UA: NORMAL
SL AMB POCT NITRITE,UA: NORMAL
SL AMB POCT PH,UA: 5
SL AMB POCT SPECIFIC GRAVITY,UA: 1.03
SL AMB POCT URINE PROTEIN: NORMAL
SL AMB POCT UROBILINOGEN: 0.2

## 2019-11-18 PROCEDURE — 81002 URINALYSIS NONAUTO W/O SCOPE: CPT | Performed by: PHYSICIAN ASSISTANT

## 2019-11-18 PROCEDURE — 99203 OFFICE O/P NEW LOW 30 MIN: CPT | Performed by: PHYSICIAN ASSISTANT

## 2019-11-18 NOTE — PROGRESS NOTES
1  Flank pain  CT renal stone study abdomen pelvis wo contrast    POCT urine dip   2  History of kidney stones  CT renal stone study abdomen pelvis wo contrast    POCT urine dip         Assessment and plan:       1  History of nephrolithiasis  2  Left flank pain    Will have patient sign for medical record release the we can further evaluate with his previous stone burden was as well as his stone composition is  In the acute setting I recommended patient to hydrate aggressively in alternate Tylenol and ibuprofen as well as heating pad  Patient refuses heating pad as he states that this will exacerbate his sciatica  He also states that he is unable to drink water and only can palate iced tea  I discouraged ice tea consumption however patient is not amenable  Will obtain a CT stone study for further evaluation  He is encouraged to contact us in the meantime with any concerns  ER precautions advised All questions answered  Song Senior PA-C      Chief Complaint     New patient kidney stones    History of Present Illness     Sukhjinder Frederick is a 45 y o  male presenting today as a new patient for history of nephrolithiasis  Patient states he previously had seen Dr Funmi Palomares at [facility]  He reports that he previously underwent extracorporeal shockwave lithotripsy ureteral stent placement approximately 2-3 years ago  He states that he had large stone burden of approximately 1 2 cm, however was told postoperatively that he did have some residual stone burden  He has not been seen over the course of the past year  Patient endorses new onset left flank pain over the past 3-4 weeks  Denies any dysuria, gross hematuria, nausea, vomiting, fevers, or chills  Patient does report anxiety and states that he is having new onset right flank pain to however he feels like this is most likely secondary to his anxiety      Patient has not had any recent imaging of his abdomen pelvis for evaluation of his stone burden  Urine dip in the office today is leukocyte and nitrite negative, trace hemolyzed blood  Laboratory     Lab Results   Component Value Date    CREATININE 1 02 09/23/2019       Review of Systems     Review of Systems   Constitutional: Negative for activity change, appetite change, chills, diaphoresis, fatigue, fever and unexpected weight change  Respiratory: Negative for chest tightness and shortness of breath  Cardiovascular: Negative for chest pain, palpitations and leg swelling  Gastrointestinal: Negative for abdominal distention, abdominal pain, constipation, diarrhea, nausea and vomiting  Genitourinary: Negative for decreased urine volume, difficulty urinating, dysuria, enuresis, flank pain, frequency, genital sores, hematuria and urgency  Musculoskeletal: Negative for back pain, gait problem and myalgias  Skin: Negative for color change, pallor, rash and wound  Psychiatric/Behavioral: Negative for behavioral problems  The patient is not nervous/anxious  Allergies     No Known Allergies    Physical Exam     Physical Exam   Constitutional: He is oriented to person, place, and time  He appears well-developed and well-nourished  No distress  HENT:   Head: Normocephalic and atraumatic  Right Ear: External ear normal    Left Ear: External ear normal    Nose: Nose normal    Eyes: Conjunctivae are normal  Right eye exhibits no discharge  Left eye exhibits no discharge  Neck: Normal range of motion  No tracheal deviation present  Pulmonary/Chest: Effort normal  No respiratory distress  Musculoskeletal: Normal range of motion  He exhibits no edema or deformity  Neurological: He is alert and oriented to person, place, and time  Skin: Skin is warm and dry  No rash noted  He is not diaphoretic  No erythema  Psychiatric: He has a normal mood and affect   His behavior is normal          Vital Signs     Vitals:    11/18/19 1433   BP: 132/68   BP Location: Left arm Patient Position: Sitting   Cuff Size: Standard   Pulse: 87   Weight: 119 kg (262 lb)   Height: 5' 10" (1 778 m)         Current Medications       Current Outpatient Medications:     Acetaminophen (TYLENOL PO), Take by mouth, Disp: , Rfl:     IBUPROFEN PO, Take by mouth, Disp: , Rfl:       Active Problems     Patient Active Problem List   Diagnosis    Chronic left-sided low back pain with left-sided sciatica    Numbness of left foot    Intervertebral disc disorder with radiculopathy of lumbar region         Past Medical History     Past Medical History:   Diagnosis Date    Back pain     Kidney stone          Surgical History     Past Surgical History:   Procedure Laterality Date    KIDNEY STONE SURGERY           Family History     Family History   Problem Relation Age of Onset    No Known Problems Mother     No Known Problems Father          Social History     Social History       Radiology

## 2019-12-02 ENCOUNTER — HOSPITAL ENCOUNTER (OUTPATIENT)
Dept: RADIOLOGY | Facility: MEDICAL CENTER | Age: 44
Discharge: HOME | End: 2019-12-02
Payer: COMMERCIAL

## 2019-12-02 DIAGNOSIS — Z87.442 HISTORY OF KIDNEY STONES: ICD-10-CM

## 2019-12-02 DIAGNOSIS — R10.9 FLANK PAIN: ICD-10-CM

## 2019-12-02 PROCEDURE — 74176 CT ABD & PELVIS W/O CONTRAST: CPT

## 2020-02-19 ENCOUNTER — OFFICE VISIT (OUTPATIENT)
Dept: OBGYN CLINIC | Facility: MEDICAL CENTER | Age: 45
End: 2020-02-19
Payer: COMMERCIAL

## 2020-02-19 VITALS
SYSTOLIC BLOOD PRESSURE: 146 MMHG | HEART RATE: 89 BPM | HEIGHT: 70 IN | DIASTOLIC BLOOD PRESSURE: 94 MMHG | BODY MASS INDEX: 37.51 KG/M2 | WEIGHT: 262 LBS

## 2020-02-19 DIAGNOSIS — G89.29 CHRONIC LEFT-SIDED LOW BACK PAIN WITH LEFT-SIDED SCIATICA: Primary | ICD-10-CM

## 2020-02-19 DIAGNOSIS — R29.898 ANKLE WEAKNESS: ICD-10-CM

## 2020-02-19 DIAGNOSIS — R20.8 NUMBNESS OF LEFT FOOT: ICD-10-CM

## 2020-02-19 DIAGNOSIS — M54.42 CHRONIC LEFT-SIDED LOW BACK PAIN WITH LEFT-SIDED SCIATICA: Primary | ICD-10-CM

## 2020-02-19 DIAGNOSIS — M51.26 LUMBAR DISC HERNIATION: ICD-10-CM

## 2020-02-19 PROCEDURE — 3077F SYST BP >= 140 MM HG: CPT | Performed by: EMERGENCY MEDICINE

## 2020-02-19 PROCEDURE — 99214 OFFICE O/P EST MOD 30 MIN: CPT | Performed by: EMERGENCY MEDICINE

## 2020-02-19 PROCEDURE — 3080F DIAST BP >= 90 MM HG: CPT | Performed by: EMERGENCY MEDICINE

## 2020-02-19 PROCEDURE — 3008F BODY MASS INDEX DOCD: CPT | Performed by: EMERGENCY MEDICINE

## 2020-02-19 RX ORDER — MELOXICAM 15 MG/1
15 TABLET ORAL DAILY
Qty: 30 TABLET | Refills: 1 | Status: SHIPPED | OUTPATIENT
Start: 2020-02-19 | End: 2020-12-23 | Stop reason: ALTCHOICE

## 2020-02-19 RX ORDER — METHOCARBAMOL 750 MG/1
750 TABLET, FILM COATED ORAL EVERY 6 HOURS PRN
Qty: 30 TABLET | Refills: 1 | Status: SHIPPED | OUTPATIENT
Start: 2020-02-19 | End: 2020-12-23 | Stop reason: ALTCHOICE
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
## 2020-02-19 NOTE — PATIENT INSTRUCTIONS
While taking meloxicam do not take any other NSAIDs such as Advil, ibuprofen, naproxen or Aleve  However you may take Tylenol

## 2020-03-27 ENCOUNTER — AMB VIDEO VISIT (OUTPATIENT)
Dept: OTHER | Facility: HOSPITAL | Age: 45
End: 2020-03-27

## 2020-03-27 DIAGNOSIS — F41.9 ANXIETY: Primary | ICD-10-CM

## 2020-03-27 PROBLEM — M47.817 SPONDYLOSIS OF LUMBOSACRAL JOINT WITHOUT MYELOPATHY: Status: ACTIVE | Noted: 2019-04-12

## 2020-03-27 PROBLEM — M54.16 RADICULOPATHY, LUMBAR REGION: Status: ACTIVE | Noted: 2019-04-12

## 2020-03-27 PROBLEM — M51.26 LUMBAR DISC HERNIATION: Status: ACTIVE | Noted: 2019-04-12

## 2020-03-27 PROCEDURE — EVISIT: Performed by: FAMILY MEDICINE

## 2020-03-27 NOTE — PROGRESS NOTES
[id number]        NAME: Benito Pastrana is a 45 y o  male  : 1975    MRN: [id number]  DATE: 2020  TIME: 2:06 PM    Assessment and Plan   Anxiety [F41 9]  1  Anxiety           Patient Instructions     Patient Instructions   I had a lengthy discussion with the patient regarding his anxiety and techniques to help alleviate the anxiety which at this time is related to the COVID-19 outbreak  We discussed maintaining a healthy balanced diet, drinking plenty of fluids to stay hydrated, and getting at least 8 hours of sleep at night  I recommended to continue the meditation techniques  I counseled the patient on discontinuation of the vaping and THC use as both would be considered risk factors for increased morbidity or mortality related to COVID-19  In regards to specific infection prevention information, I discussed frequent hand washing with soap and warm water, using hand , and wearing gloves and a mask at work if possible  I informed if his symptoms persist despite the above techniques, he should follow up w/ his PCP for further evaluation and treatment  Patient verbalizes understanding of the above and agrees w/ the discussed treatment plan  Follow up with PCP in 5-7 days  Proceed to  ER if symptoms worsen  Video Visit - Benito Pastrana 45 y o  male MRN: [id number]    REQUIRED DOCUMENTATION:       There were no vitals filed for this visit  1  This service was provided via [facility]  2  Provider located at [address] [facility]  [phone]   3  [facility] provider: Nory Troncoso MD   4  Identify all parties in room with patient during [facility] visit: only patient  5  After connecting through [employer], patient was identified by name and date of birth  Patient was then informed that this was a Telemedicine visit and that the exam was being conducted confidentially over secure lines  My office door was closed   Other methods to assure confidentiality were taken  No one else was in the room  and The following individuals were in the room with me and the patient informed  Patient acknowledged consent and understanding of privacy and security of the Telemedicine visit  I informed the patient that I have reviewed their record in Epic and presented the opportunity for them to ask any questions regarding the visit today  The patient agreed to participate  Chief Complaint     Chief Complaint   Patient presents with    Anxiety         History of Present Illness       46 yo male presents via virtual visit c/o anxiety  He states he has a hx of anxiety in the past which he has been able to manage with self calming and meditation techniques  He states over the past few weeks he has become more anxious due to the COVID-19 outbreak  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 throughout the day  He states he has hand  which he has been using often  He denies any recent travel, international or domestic  He denies any known exposure to anyone with presumptive or confirmed COVID-19  He is not having any symptoms consistent with a COVID-like illness  No fever/chills  No headache or body aches  No cold/URI sx  No chest pain, SOB, or palpitations  No cough or wheezing  Patient denies tobacco use, but does admit to vaping THC  He denies any etoh or illicit drug use  No GI sx  No skin rashes  He denies any feelings of depression  No thoughts to hurt himself or hurt others  He feels safe at home and at work  He is awake, alert, oriented x 3 in no apparent distress, able to speak in complete sentences and provide a complete and reliable history  Review of Systems   Review of Systems   Constitutional: Negative  HENT: Negative  Respiratory: Negative  Cardiovascular: Negative  Gastrointestinal: Negative  Musculoskeletal: Negative  Skin: Negative  Neurological: Negative  Psychiatric/Behavioral: Negative for suicidal ideas  The patient is nervous/anxious  As noted in HPI         Current Medications       Current Outpatient Medications:     Acetaminophen (TYLENOL PO), Take by mouth, Disp: , Rfl:     IBUPROFEN PO, Take by mouth, Disp: , Rfl:     meloxicam (MOBIC) 15 mg tablet, Take 1 tablet (15 mg total) by mouth daily, Disp: 30 tablet, Rfl: 1    methocarbamol (ROBAXIN) 750 mg tablet, Take 1 tablet (750 mg total) by mouth every 6 (six) hours as needed for muscle spasms, Disp: 30 tablet, Rfl: 1    Current Allergies     Allergies as of 03/27/2020    (No Known Allergies)            The following portions of the patient's history were reviewed and updated as appropriate: allergies, current medications, past family history, past medical history, past social history, past surgical history and problem list      Past Medical History:   Diagnosis Date    Back pain     Kidney stone        Past Surgical History:   Procedure Laterality Date    KIDNEY STONE SURGERY         Family History   Problem Relation Age of Onset    No Known Problems Mother     No Known Problems Father          Medications have been verified  Objective   There were no vitals taken for this visit  Physical Exam     Physical Exam   Constitutional: He is oriented to person, place, and time  He appears well-developed and well-nourished  He is active and cooperative  Non-toxic appearance  He does not have a sickly appearance  He does not appear ill  No distress  Pulmonary/Chest: Effort normal  No accessory muscle usage  No tachypnea  No respiratory distress  Neurological: He is alert and oriented to person, place, and time  Skin: He is not diaphoretic  No pallor  Psychiatric: He has a normal mood and affect   His speech is normal and behavior is normal  Judgment and thought content normal  Cognition and memory are normal

## 2020-03-27 NOTE — PATIENT INSTRUCTIONS
I had a lengthy discussion with the patient regarding his anxiety and techniques to help alleviate the anxiety which at this time is related to the COVID-19 outbreak  We discussed maintaining a healthy balanced diet, drinking plenty of fluids to stay hydrated, and getting at least 8 hours of sleep at night  I recommended to continue the meditation techniques  I counseled the patient on discontinuation of the vaping and THC use as both would be considered risk factors for increased morbidity or mortality related to COVID-19  In regards to specific infection prevention information, I discussed frequent hand washing with soap and warm water, using hand , and wearing gloves and a mask at work if possible  I informed if his symptoms persist despite the above techniques, he should follow up w/ his PCP for further evaluation and treatment  Patient verbalizes understanding of the above and agrees w/ the discussed treatment plan

## 2020-03-27 NOTE — CARE ANYWHERE EVISITS
Visit Summary for Layne Lemos - Gender: Male - Date of Birth: [id number]  Date: [id number] - Duration: 13 minutes  Patient: Layne Lemos  Provider: Samantha Gross    Patient Contact Information  Address  [address]  [place]; [address]  [phone]    Visit Topics  Stomachache [Added By: Self - 2020-03-27]  Stress / Anxiety [Added By: Self - 2020-03-27]    Triage Questions   Have you had any international travel in the last 14 days? Answer [No]  Have you had any exposure to a known or expected Covid-19 patient in the last 14 days? Answer [??]  Do you have any immune system compromise or chronic lung disease? Answer [No]  Do you have any vulnerable family members in the home (infant, pregnant, cancer, elderly)? Answer [No]     Conversation Transcripts  [0A][0A] [Notification] You are connected with Samantha Gross, Urgent Care Specialist [0A][Notification] Layne Lemos is located in [place]  [0A][Notification] Layne Lemos has shared health history  [facility]  [0A]    Diagnosis    Procedures  Value: [id number] Code: CPT-4 UNLISTED E&M SERVICE    Medications Prescribed    methocarbamol      Frequency :             Electronically signed by: Milton Sinclair(NPI [id number])

## 2020-03-30 ENCOUNTER — TELEMEDICINE (OUTPATIENT)
Dept: FAMILY MEDICINE CLINIC | Facility: CLINIC | Age: 45
End: 2020-03-30
Payer: COMMERCIAL

## 2020-03-30 DIAGNOSIS — F43.0 ACUTE STRESS REACTION: ICD-10-CM

## 2020-03-30 DIAGNOSIS — R10.11 COLICKY RUQ ABDOMINAL PAIN: Primary | ICD-10-CM

## 2020-03-30 DIAGNOSIS — L40.9 PSORIASIS: ICD-10-CM

## 2020-03-30 PROCEDURE — 99214 OFFICE O/P EST MOD 30 MIN: CPT | Performed by: FAMILY MEDICINE

## 2020-03-30 RX ORDER — TRIAMCINOLONE ACETONIDE 1 MG/G
CREAM TOPICAL 2 TIMES DAILY
Qty: 30 G | Refills: 0 | Status: SHIPPED | OUTPATIENT
Start: 2020-03-30 | End: 2020-12-23 | Stop reason: ALTCHOICE

## 2020-03-30 RX ORDER — LORAZEPAM 0.5 MG/1
.25-.5 TABLET ORAL 2 TIMES DAILY PRN
Qty: 30 TABLET | Refills: 0 | Status: SHIPPED | OUTPATIENT
Start: 2020-03-30 | End: 2020-05-27 | Stop reason: SDUPTHER
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
## 2020-05-18 ENCOUNTER — TELEMEDICINE (OUTPATIENT)
Dept: FAMILY MEDICINE CLINIC | Facility: CLINIC | Age: 45
End: 2020-05-18
Payer: COMMERCIAL

## 2020-05-18 DIAGNOSIS — Z87.442 HISTORY OF RENAL CALCULI: ICD-10-CM

## 2020-05-18 DIAGNOSIS — R10.9 RIGHT FLANK PAIN: Primary | ICD-10-CM

## 2020-05-18 PROCEDURE — 99214 OFFICE O/P EST MOD 30 MIN: CPT | Performed by: FAMILY MEDICINE

## 2020-05-27 DIAGNOSIS — F43.0 ACUTE STRESS REACTION: ICD-10-CM

## 2020-05-28 RX ORDER — LORAZEPAM 0.5 MG/1
.25-.5 TABLET ORAL 2 TIMES DAILY PRN
Qty: 30 TABLET | Refills: 0 | Status: SHIPPED | OUTPATIENT
Start: 2020-05-28 | End: 2020-06-10

## 2020-06-10 DIAGNOSIS — F43.0 ACUTE STRESS REACTION: ICD-10-CM

## 2020-06-10 RX ORDER — LORAZEPAM 0.5 MG/1
TABLET ORAL
Qty: 30 TABLET | Refills: 1 | Status: SHIPPED | OUTPATIENT
Start: 2020-06-10 | End: 2020-07-11

## 2020-06-20 DIAGNOSIS — F43.0 ACUTE STRESS REACTION: ICD-10-CM

## 2020-06-23 RX ORDER — LORAZEPAM 0.5 MG/1
TABLET ORAL
Qty: 30 TABLET | OUTPATIENT
Start: 2020-06-23

## 2020-07-10 DIAGNOSIS — F43.0 ACUTE STRESS REACTION: ICD-10-CM

## 2020-07-11 RX ORDER — LORAZEPAM 0.5 MG/1
TABLET ORAL
Qty: 30 TABLET | Refills: 0 | Status: SHIPPED | OUTPATIENT
Start: 2020-07-11 | End: 2020-07-13 | Stop reason: SDUPTHER

## 2020-07-13 ENCOUNTER — TELEPHONE (OUTPATIENT)
Dept: FAMILY MEDICINE CLINIC | Facility: CLINIC | Age: 45
End: 2020-07-13

## 2020-07-13 DIAGNOSIS — F43.0 ACUTE STRESS REACTION: ICD-10-CM

## 2020-07-13 RX ORDER — LORAZEPAM 0.5 MG/1
0.5 TABLET ORAL 2 TIMES DAILY PRN
Qty: 30 TABLET | Refills: 0 | Status: SHIPPED | OUTPATIENT
Start: 2020-07-13 | End: 2020-07-30 | Stop reason: SDUPTHER

## 2020-07-13 NOTE — TELEPHONE ENCOUNTER
Called & spoke to pt to schedule appt    He is waiting to have his US done then will call to schedule f/u appt

## 2020-07-13 NOTE — TELEPHONE ENCOUNTER
Rx was sent to [facility] but patient did not pick it up there  He is requesting it be sent to [facility]  He is aware that he needs an appointment  Will call back

## 2020-07-21 ENCOUNTER — HOSPITAL ENCOUNTER (OUTPATIENT)
Dept: RADIOLOGY | Facility: MEDICAL CENTER | Age: 45
Discharge: HOME | End: 2020-07-21
Payer: COMMERCIAL

## 2020-07-21 DIAGNOSIS — R10.11 COLICKY RUQ ABDOMINAL PAIN: ICD-10-CM

## 2020-07-21 PROCEDURE — 76705 ECHO EXAM OF ABDOMEN: CPT

## 2020-07-30 ENCOUNTER — TELEMEDICINE (OUTPATIENT)
Dept: FAMILY MEDICINE CLINIC | Facility: CLINIC | Age: 45
End: 2020-07-30
Payer: COMMERCIAL

## 2020-07-30 DIAGNOSIS — R10.11 COLICKY RUQ ABDOMINAL PAIN: ICD-10-CM

## 2020-07-30 DIAGNOSIS — F43.0 ACUTE STRESS REACTION: ICD-10-CM

## 2020-07-30 DIAGNOSIS — F41.9 ANXIETY: Primary | ICD-10-CM

## 2020-07-30 DIAGNOSIS — Z20.822 EXPOSURE TO COVID-19 VIRUS: ICD-10-CM

## 2020-07-30 PROCEDURE — 99214 OFFICE O/P EST MOD 30 MIN: CPT | Performed by: FAMILY MEDICINE

## 2020-07-30 PROCEDURE — 3080F DIAST BP >= 90 MM HG: CPT | Performed by: FAMILY MEDICINE

## 2020-07-30 PROCEDURE — 3077F SYST BP >= 140 MM HG: CPT | Performed by: FAMILY MEDICINE

## 2020-07-30 RX ORDER — LORAZEPAM 0.5 MG/1
0.5 TABLET ORAL 2 TIMES DAILY PRN
Qty: 30 TABLET | Refills: 0 | Status: SHIPPED | OUTPATIENT
Start: 2020-07-30 | End: 2020-12-23 | Stop reason: SDUPTHER

## 2020-07-30 NOTE — PROGRESS NOTES
Virtual Regular Visit      Assessment/Plan:    Problem List Items Addressed This Visit        Other    Anxiety - Primary      Other Visit Diagnoses     Acute stress reaction        Relevant Medications    LORazepam (ATIVAN) 0 5 mg tablet    Colicky RUQ abdominal pain        Exposure to COVID-19 virus                   Reason for visit is   Chief Complaint   Patient presents with    Virtual Regular Visit        Encounter provider Ole Martínez MD    Provider located at [address] [place] [address] 110`  [place] [address]  [phone]      Recent Visits  No visits were found meeting these conditions  Showing recent visits within past 7 days and meeting all other requirements     Today's Visits  Date Type Provider Dept   07/30/20 Telemedicine Ole Martínez MD [facility]   Showing today's visits and meeting all other requirements     Future Appointments  No visits were found meeting these conditions  Showing future appointments within next 150 days and meeting all other requirements        The patient was identified by name and date of birth  Devendra Ambrocio was informed that this is a telemedicine visit and that the visit is being conducted through [facility] and patient was informed that this is not a secure, HIPAA-complaint platform  He agrees to proceed     My office door was closed  No one else was in the room  He acknowledged consent and understanding of privacy and security of the video platform  The patient has agreed to participate and understands they can discontinue the visit at any time  Patient is aware this is a billable service  Subjective  Devendra Ambrocio is a 39 y o  male f/u   F/u visit  COVID exposure at work pending? Hiking several days a week  Tracking steps on his phone  Hurt his ankle and his back  Work anxiety  US revealed hepatic cysts, which he knew about  GB ok  abd discomfort has been intermittent, not worsening   No change with food, using the ativan only 3-4 times a week, mostly on work days  Helps with social anxiety  Did not  lorazepam rx at [facility] that was called in on 7/13 per PDMP, had a problem with [facility] pharmacy and now going back to [facility]  Works for [employer] [employer] customers, work environment not wearing masks, customers not wearing masks either at times  High stress  Boss reduced staff, higher work load  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, deliberately coughing at the office and uses pt work space at times  She does not wear a mask  She is currently undergoing testing for COVID  Pt is asx and does not wish to be tested unless he develops sx or her test comes back positive  Work has not mandated quarantine or testing bc they are so short staffed  Past Medical History:   Diagnosis Date    Back pain     Kidney stone        Past Surgical History:   Procedure Laterality Date    KIDNEY STONE SURGERY         Current Outpatient Medications   Medication Sig Dispense Refill    Acetaminophen (TYLENOL PO) Take by mouth      IBUPROFEN PO Take by mouth      LORazepam (ATIVAN) 0 5 mg tablet Take 1 tablet (0 5 mg total) by mouth 2 (two) times a day as needed for anxiety 30 tablet 0    meloxicam (MOBIC) 15 mg tablet Take 1 tablet (15 mg total) by mouth daily 30 tablet 1    methocarbamol (ROBAXIN) 750 mg tablet Take 1 tablet (750 mg total) by mouth every 6 (six) hours as needed for muscle spasms 30 tablet 1    triamcinolone (KENALOG) 0 1 % cream Apply topically 2 (two) times a day 30 g 0     No current facility-administered medications for this visit  No Known Allergies    Review of Systems   Constitutional: Negative for fatigue and fever  HENT: Negative for congestion  Eyes: Negative for visual disturbance  Respiratory: Negative for chest tightness and shortness of breath  Cardiovascular: Negative for chest pain and palpitations  Gastrointestinal: Positive for abdominal pain     Genitourinary: Negative for difficulty urinating  Musculoskeletal: Negative for arthralgias  Neurological: Negative for headaches  Hematological: Does not bruise/bleed easily  Psychiatric/Behavioral: The patient is nervous/anxious  Video Exam    There were no vitals filed for this visit  Physical Exam   Constitutional: He is oriented to person, place, and time  He appears well-developed and well-nourished  Pulmonary/Chest: Effort normal    Neurological: He is alert and oriented to person, place, and time  Psychiatric: He has a normal mood and affect  His behavior is normal  Judgment and thought content normal         I spent 17 minutes directly with the patient during this visit      VIRTUAL VISIT DISCLAIMER    Kayla Smith acknowledges that he has consented to an online visit or consultation  He understands that the online visit is based solely on information provided by him, and that, in the absence of a face-to-face physical evaluation by the physician, the diagnosis he receives is both limited and provisional in terms of accuracy and completeness  This is not intended to replace a full medical face-to-face evaluation by the physician  Kayla Smith understands and accepts these terms

## 2020-12-19 ENCOUNTER — NURSE TRIAGE (OUTPATIENT)
Dept: OTHER | Facility: OTHER | Age: 45
End: 2020-12-19

## 2020-12-23 ENCOUNTER — OFFICE VISIT (OUTPATIENT)
Dept: FAMILY MEDICINE CLINIC | Facility: CLINIC | Age: 45
End: 2020-12-23
Payer: COMMERCIAL

## 2020-12-23 VITALS
HEIGHT: 70 IN | HEART RATE: 93 BPM | DIASTOLIC BLOOD PRESSURE: 88 MMHG | RESPIRATION RATE: 18 BRPM | SYSTOLIC BLOOD PRESSURE: 150 MMHG | WEIGHT: 253 LBS | TEMPERATURE: 97.5 F | OXYGEN SATURATION: 98 % | BODY MASS INDEX: 36.22 KG/M2

## 2020-12-23 DIAGNOSIS — R10.9 ACUTE FLANK PAIN: Primary | ICD-10-CM

## 2020-12-23 DIAGNOSIS — Z23 ENCOUNTER FOR IMMUNIZATION: ICD-10-CM

## 2020-12-23 DIAGNOSIS — R31.29 MICROSCOPIC HEMATURIA: ICD-10-CM

## 2020-12-23 DIAGNOSIS — Z87.442 HISTORY OF RENAL CALCULI: ICD-10-CM

## 2020-12-23 DIAGNOSIS — F43.0 ACUTE STRESS REACTION: ICD-10-CM

## 2020-12-23 LAB
SL AMB  POCT GLUCOSE, UA: NORMAL
SL AMB LEUKOCYTE ESTERASE,UA: NORMAL
SL AMB POCT BILIRUBIN,UA: NORMAL
SL AMB POCT BLOOD,UA: NORMAL
SL AMB POCT CLARITY,UA: CLEAR
SL AMB POCT COLOR,UA: YELLOW
SL AMB POCT KETONES,UA: NORMAL
SL AMB POCT NITRITE,UA: NORMAL
SL AMB POCT PH,UA: 6
SL AMB POCT SPECIFIC GRAVITY,UA: 1.02
SL AMB POCT URINE PROTEIN: NORMAL
SL AMB POCT UROBILINOGEN: NORMAL

## 2020-12-23 PROCEDURE — 81002 URINALYSIS NONAUTO W/O SCOPE: CPT | Performed by: PHYSICIAN ASSISTANT

## 2020-12-23 PROCEDURE — 90686 IIV4 VACC NO PRSV 0.5 ML IM: CPT | Performed by: PHYSICIAN ASSISTANT

## 2020-12-23 PROCEDURE — 90471 IMMUNIZATION ADMIN: CPT | Performed by: PHYSICIAN ASSISTANT

## 2020-12-23 PROCEDURE — 87086 URINE CULTURE/COLONY COUNT: CPT | Performed by: PHYSICIAN ASSISTANT

## 2020-12-23 PROCEDURE — 99214 OFFICE O/P EST MOD 30 MIN: CPT | Performed by: PHYSICIAN ASSISTANT

## 2020-12-23 RX ORDER — LORAZEPAM 0.5 MG/1
0.5 TABLET ORAL 2 TIMES DAILY PRN
Qty: 30 TABLET | Refills: 0 | Status: SHIPPED | OUTPATIENT
Start: 2020-12-23 | End: 2021-04-06 | Stop reason: SDUPTHER

## 2020-12-25 LAB — BACTERIA UR CULT: NORMAL

## 2021-01-04 ENCOUNTER — HOSPITAL ENCOUNTER (OUTPATIENT)
Dept: RADIOLOGY | Facility: MEDICAL CENTER | Age: 46
Discharge: HOME | End: 2021-01-04
Payer: COMMERCIAL

## 2021-01-04 ENCOUNTER — TELEPHONE (OUTPATIENT)
Dept: FAMILY MEDICINE CLINIC | Facility: CLINIC | Age: 46
End: 2021-01-04

## 2021-01-04 DIAGNOSIS — R31.29 MICROSCOPIC HEMATURIA: ICD-10-CM

## 2021-01-04 DIAGNOSIS — Z87.442 HISTORY OF RENAL CALCULI: ICD-10-CM

## 2021-01-04 DIAGNOSIS — N20.0 RENAL CALCULUS, LEFT: Primary | ICD-10-CM

## 2021-01-04 DIAGNOSIS — N13.2 HYDRONEPHROSIS WITH URINARY OBSTRUCTION DUE TO RENAL CALCULUS: ICD-10-CM

## 2021-01-04 DIAGNOSIS — R10.9 ACUTE FLANK PAIN: ICD-10-CM

## 2021-01-04 PROCEDURE — 74176 CT ABD & PELVIS W/O CONTRAST: CPT

## 2021-01-04 PROCEDURE — G1004 CDSM NDSC: HCPCS

## 2021-01-04 NOTE — TELEPHONE ENCOUNTER
Patient received message, will wait for urology if does not hear from them he will give us a call back

## 2021-01-04 NOTE — TELEPHONE ENCOUNTER
Left message for patient  His CT scan shows a left renal calculus of 8 mm which is causing some hydronephrosis    I am going to place a urology consult for patient to be seen as soon as possible

## 2021-01-14 ENCOUNTER — TELEPHONE (OUTPATIENT)
Dept: UROLOGY | Facility: CLINIC | Age: 46
End: 2021-01-14

## 2021-01-14 NOTE — TELEPHONE ENCOUNTER
Referral message received for scheduling assistance  Patient to PCP 12/23/20 for flank pain  CT done 1/4/21:   IMPRESSION:     8 mm calculus in the left UPJ with moderate upstream hydronephrosis  No perinephric collection      2 mm left renal calculus      The study was marked in EPIC for immediate notification  Called and left detailed message per communication consent  Advised we would like to see him sooner rather than later  Offered spot on Angela BENNETT's schedule today, either 115pm or 230pm  Asking for a call back to confirm either of these spots, number provided  Please confirm one of those spots when patient returns call, he needs to be seen

## 2021-01-18 NOTE — TELEPHONE ENCOUNTER
Called and spoke with patient  He was last seen 11/2019 and is not a new patient  He reports he is not having any symptoms currently with stone and was unsure about making an appt  I reviewed that a large 8 mm is not likely to pass on it's own and may continue to cause him problems intermittently  I suggested he come in non-urgently since he is asymptomatic to discuss his options in treating the stone electively  Patient is agreeable  He had a covid exposure last week and is currently quarantining  Scheduled 2/10/20 at [phone] with Solomon Skiff, [place] at the [facility]  Patient confirmed appt details  ER precautions reviewed and encouraged him to call back if he starts to develop symptoms again prior to his appt  Patient verbalized understanding and agrees to plan

## 2021-01-18 NOTE — TELEPHONE ENCOUNTER
Patient requesting call back  Patient states his 8 mm cyst is not obstructing anymore  Patient unsure due to covid how he feels about coming and would like to discuss with nurse possible virtual appointment  Please advise

## 2021-01-19 NOTE — TELEPHONE ENCOUNTER
This patient can be set up with any provider for a 8 mm left UPJ stone  The timing of his appointment can be determined by his level of pain  It would be okay to do a virtual appointment and discuss surgery scheduling for ureteroscopy

## 2021-02-10 ENCOUNTER — OFFICE VISIT (OUTPATIENT)
Dept: UROLOGY | Facility: CLINIC | Age: 46
End: 2021-02-10
Payer: COMMERCIAL

## 2021-02-10 VITALS
BODY MASS INDEX: 36.54 KG/M2 | HEIGHT: 70 IN | DIASTOLIC BLOOD PRESSURE: 88 MMHG | WEIGHT: 255.2 LBS | SYSTOLIC BLOOD PRESSURE: 138 MMHG | HEART RATE: 88 BPM

## 2021-02-10 DIAGNOSIS — N13.2 HYDRONEPHROSIS WITH URINARY OBSTRUCTION DUE TO RENAL CALCULUS: Primary | ICD-10-CM

## 2021-02-10 DIAGNOSIS — N20.0 RENAL CALCULUS, LEFT: ICD-10-CM

## 2021-02-10 PROCEDURE — 81002 URINALYSIS NONAUTO W/O SCOPE: CPT | Performed by: PHYSICIAN ASSISTANT

## 2021-02-10 PROCEDURE — 99213 OFFICE O/P EST LOW 20 MIN: CPT | Performed by: PHYSICIAN ASSISTANT

## 2021-02-10 NOTE — PROGRESS NOTES
UROLOGY PROGRESS NOTE   Patient Identifiers: Eri Aguilar (MRN [id number])  Date of Service: 2/10/2021    Subjective:    79-year-old man with history kidney stones  He was treated by Dr Joceline Montesinos in the past   On January 4th he had a CT scan showing an 8 mm stone in the left UPJ  He is currently pain free  His urine shows microscopic blood  There is a small stone in the lower pole left kidney  Reason for visit:  Kidney stone follow-up    Objective:     VITALS:    Vitals:    02/10/21 1137   BP: 138/88   Pulse: 88           LABS:  Lab Results   Component Value Date    HGB 16 6 09/23/2019    HCT 50 2 (H) 09/23/2019    WBC 9 18 09/23/2019     09/23/2019   ]    Lab Results   Component Value Date    K 3 9 09/23/2019     09/23/2019    CO2 26 09/23/2019    BUN 14 09/23/2019    CREATININE 1 02 09/23/2019    CALCIUM 9 2 09/23/2019   ]        INPATIENT MEDS:    Current Outpatient Medications:     Acetaminophen (TYLENOL PO), Take by mouth, Disp: , Rfl:     IBUPROFEN PO, Take by mouth, Disp: , Rfl:     LORazepam (ATIVAN) 0 5 mg tablet, Take 1 tablet (0 5 mg total) by mouth 2 (two) times a day as needed for anxiety, Disp: 30 tablet, Rfl: 0      Physical Exam:   /88   Pulse 88   Ht 5' 10" (1 778 m)   Wt 116 kg (255 lb 3 2 oz)   BMI 36 62 kg/m²   GEN: no acute distress    RESP: breathing comfortably with no accessory muscle use    ABD: soft, non-tender, non-distended   INCISION:    EXT: no significant peripheral edema       RADIOLOGY:   CT ABDOMEN AND PELVIS WITHOUT IV CONTRAST - LOW DOSE RENAL STONE      IMPRESSION:     8 mm calculus in the left UPJ with moderate upstream hydronephrosis  No perinephric collection  2 mm left renal calculus           Assessment:     1  8 mm left UPJ stone with hydronephrosis     Plan:   - I reviewed the options of management with the patient  - I recommend we schedule a ureteroscopy with laser stone removal and stent placement  - he declines treatment at this time stating work concerns  - I reviewed emergency room precautions with  - he will follow-up in 3 months with a KUB prior to visit  - he will call sooner if he has increasing pain or changes his mind

## 2021-04-05 PROBLEM — E66.09 CLASS 2 OBESITY DUE TO EXCESS CALORIES WITHOUT SERIOUS COMORBIDITY WITH BODY MASS INDEX (BMI) OF 36.0 TO 36.9 IN ADULT: Status: ACTIVE | Noted: 2021-04-05

## 2021-04-05 PROBLEM — E66.812 CLASS 2 OBESITY DUE TO EXCESS CALORIES WITHOUT SERIOUS COMORBIDITY WITH BODY MASS INDEX (BMI) OF 36.0 TO 36.9 IN ADULT: Status: ACTIVE | Noted: 2021-04-05

## 2021-04-06 ENCOUNTER — OFFICE VISIT (OUTPATIENT)
Dept: FAMILY MEDICINE CLINIC | Facility: CLINIC | Age: 46
End: 2021-04-06
Payer: COMMERCIAL

## 2021-04-06 VITALS
WEIGHT: 249.2 LBS | OXYGEN SATURATION: 96 % | DIASTOLIC BLOOD PRESSURE: 80 MMHG | HEART RATE: 95 BPM | TEMPERATURE: 97.3 F | RESPIRATION RATE: 18 BRPM | BODY MASS INDEX: 35.68 KG/M2 | SYSTOLIC BLOOD PRESSURE: 160 MMHG | HEIGHT: 70 IN

## 2021-04-06 DIAGNOSIS — Z13.1 SCREENING FOR DIABETES MELLITUS: ICD-10-CM

## 2021-04-06 DIAGNOSIS — F41.9 ANXIETY: Primary | ICD-10-CM

## 2021-04-06 DIAGNOSIS — R03.0 ELEVATED BLOOD-PRESSURE READING WITHOUT DIAGNOSIS OF HYPERTENSION: ICD-10-CM

## 2021-04-06 DIAGNOSIS — E66.09 CLASS 2 OBESITY DUE TO EXCESS CALORIES WITHOUT SERIOUS COMORBIDITY WITH BODY MASS INDEX (BMI) OF 36.0 TO 36.9 IN ADULT: ICD-10-CM

## 2021-04-06 DIAGNOSIS — F43.0 ACUTE STRESS REACTION: ICD-10-CM

## 2021-04-06 DIAGNOSIS — Z13.220 SCREENING FOR LIPID DISORDERS: ICD-10-CM

## 2021-04-06 PROCEDURE — 99214 OFFICE O/P EST MOD 30 MIN: CPT | Performed by: PHYSICIAN ASSISTANT

## 2021-04-06 RX ORDER — LORAZEPAM 0.5 MG/1
0.5 TABLET ORAL 2 TIMES DAILY PRN
Qty: 30 TABLET | Refills: 0 | Status: SHIPPED | OUTPATIENT
Start: 2021-04-06 | End: 2021-12-21 | Stop reason: SDUPTHER

## 2021-04-06 RX ORDER — FLUOXETINE HYDROCHLORIDE 20 MG/1
20 CAPSULE ORAL DAILY
Qty: 30 CAPSULE | Refills: 2 | Status: ON HOLD | OUTPATIENT
Start: 2021-04-06 | End: 2021-11-04 | Stop reason: ALTCHOICE
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
## 2021-04-13 ENCOUNTER — IMMUNIZATIONS (OUTPATIENT)
Dept: FAMILY MEDICINE CLINIC | Facility: HOSPITAL | Age: 46
End: 2021-04-13

## 2021-04-13 DIAGNOSIS — Z23 ENCOUNTER FOR IMMUNIZATION: Primary | ICD-10-CM

## 2021-04-13 PROCEDURE — 0001A SARS-COV-2 / COVID-19 MRNA VACCINE (PFIZER-BIONTECH) 30 MCG: CPT

## 2021-04-13 PROCEDURE — 91300 SARS-COV-2 / COVID-19 MRNA VACCINE (PFIZER-BIONTECH) 30 MCG: CPT

## 2021-05-04 ENCOUNTER — IMMUNIZATIONS (OUTPATIENT)
Dept: FAMILY MEDICINE CLINIC | Facility: HOSPITAL | Age: 46
End: 2021-05-04

## 2021-05-04 DIAGNOSIS — Z23 ENCOUNTER FOR IMMUNIZATION: Primary | ICD-10-CM

## 2021-05-04 PROCEDURE — 91300 SARS-COV-2 / COVID-19 MRNA VACCINE (PFIZER-BIONTECH) 30 MCG: CPT

## 2021-05-04 PROCEDURE — 0002A SARS-COV-2 / COVID-19 MRNA VACCINE (PFIZER-BIONTECH) 30 MCG: CPT

## 2021-09-29 ENCOUNTER — TELEPHONE (OUTPATIENT)
Dept: UROLOGY | Facility: MEDICAL CENTER | Age: 46
End: 2021-09-29

## 2021-09-29 ENCOUNTER — HOSPITAL ENCOUNTER (OUTPATIENT)
Dept: RADIOLOGY | Facility: HOSPITAL | Age: 46
Discharge: HOME | End: 2021-09-29
Payer: COMMERCIAL

## 2021-09-29 DIAGNOSIS — N20.0 RENAL CALCULUS, LEFT: ICD-10-CM

## 2021-09-29 PROCEDURE — 74018 RADEX ABDOMEN 1 VIEW: CPT

## 2021-10-05 ENCOUNTER — TELEPHONE (OUTPATIENT)
Dept: UROLOGY | Facility: MEDICAL CENTER | Age: 46
End: 2021-10-05

## 2021-10-05 ENCOUNTER — OFFICE VISIT (OUTPATIENT)
Dept: UROLOGY | Facility: CLINIC | Age: 46
End: 2021-10-05
Payer: COMMERCIAL

## 2021-10-05 VITALS
HEIGHT: 70 IN | SYSTOLIC BLOOD PRESSURE: 128 MMHG | WEIGHT: 235 LBS | DIASTOLIC BLOOD PRESSURE: 88 MMHG | BODY MASS INDEX: 33.64 KG/M2

## 2021-10-05 DIAGNOSIS — N20.0 CALCULUS OF KIDNEY: Primary | ICD-10-CM

## 2021-10-05 PROCEDURE — 3008F BODY MASS INDEX DOCD: CPT | Performed by: PHYSICIAN ASSISTANT

## 2021-10-05 PROCEDURE — 99213 OFFICE O/P EST LOW 20 MIN: CPT | Performed by: PHYSICIAN ASSISTANT

## 2021-10-07 ENCOUNTER — PREP FOR PROCEDURE (OUTPATIENT)
Dept: UROLOGY | Facility: CLINIC | Age: 46
End: 2021-10-07

## 2021-10-07 ENCOUNTER — TELEPHONE (OUTPATIENT)
Dept: UROLOGY | Facility: MEDICAL CENTER | Age: 46
End: 2021-10-07

## 2021-10-07 DIAGNOSIS — N20.0 CALCULUS OF KIDNEY: Primary | ICD-10-CM

## 2021-10-08 ENCOUNTER — TELEPHONE (OUTPATIENT)
Dept: UROLOGY | Facility: MEDICAL CENTER | Age: 46
End: 2021-10-08

## 2021-10-27 ENCOUNTER — APPOINTMENT (OUTPATIENT)
Dept: LAB | Facility: CLINIC | Age: 46
End: 2021-10-27
Payer: COMMERCIAL

## 2021-10-27 DIAGNOSIS — N20.0 CALCULUS OF KIDNEY: ICD-10-CM

## 2021-10-27 PROCEDURE — 87086 URINE CULTURE/COLONY COUNT: CPT

## 2021-10-28 LAB — BACTERIA UR CULT: NORMAL

## 2021-11-04 ENCOUNTER — ANESTHESIA EVENT (OUTPATIENT)
Dept: PERIOP | Facility: HOSPITAL | Age: 46
End: 2021-11-04
Payer: COMMERCIAL

## 2021-11-04 ENCOUNTER — HOSPITAL ENCOUNTER (OUTPATIENT)
Facility: HOSPITAL | Age: 46
Setting detail: OUTPATIENT SURGERY
Discharge: HOME | End: 2021-11-04
Attending: UROLOGY | Admitting: UROLOGY
Payer: COMMERCIAL

## 2021-11-04 ENCOUNTER — APPOINTMENT (OUTPATIENT)
Dept: RADIOLOGY | Facility: HOSPITAL | Age: 46
End: 2021-11-04
Payer: COMMERCIAL

## 2021-11-04 ENCOUNTER — ANESTHESIA (OUTPATIENT)
Dept: PERIOP | Facility: HOSPITAL | Age: 46
End: 2021-11-04
Payer: COMMERCIAL

## 2021-11-04 ENCOUNTER — TELEPHONE (OUTPATIENT)
Dept: UROLOGY | Facility: CLINIC | Age: 46
End: 2021-11-04

## 2021-11-04 VITALS
BODY MASS INDEX: 34.02 KG/M2 | HEIGHT: 70 IN | TEMPERATURE: 97.5 F | OXYGEN SATURATION: 100 % | DIASTOLIC BLOOD PRESSURE: 82 MMHG | WEIGHT: 237.66 LBS | HEART RATE: 80 BPM | RESPIRATION RATE: 16 BRPM | SYSTOLIC BLOOD PRESSURE: 128 MMHG

## 2021-11-04 DIAGNOSIS — N20.0 KIDNEY STONES: Primary | ICD-10-CM

## 2021-11-04 PROCEDURE — 74420 UROGRAPHY RTRGR +-KUB: CPT

## 2021-11-04 PROCEDURE — 82360 CALCULUS ASSAY QUANT: CPT | Performed by: UROLOGY

## 2021-11-04 PROCEDURE — C1758 CATHETER, URETERAL: HCPCS | Performed by: UROLOGY

## 2021-11-04 PROCEDURE — NC001 PR NO CHARGE: Performed by: UROLOGY

## 2021-11-04 PROCEDURE — C1894 INTRO/SHEATH, NON-LASER: HCPCS | Performed by: UROLOGY

## 2021-11-04 PROCEDURE — 52356 CYSTO/URETERO W/LITHOTRIPSY: CPT | Performed by: UROLOGY

## 2021-11-04 PROCEDURE — C2617 STENT, NON-COR, TEM W/O DEL: HCPCS | Performed by: UROLOGY

## 2021-11-04 PROCEDURE — C1769 GUIDE WIRE: HCPCS | Performed by: UROLOGY

## 2021-11-04 DEVICE — INLAY OPTIMA URETERAL STENT W/O GUIDEWIRE
Type: IMPLANTABLE DEVICE | Status: FUNCTIONAL
Brand: BARD® INLAY OPTIMA® URETERAL STENT

## 2021-11-04 RX ORDER — PHENAZOPYRIDINE HYDROCHLORIDE 200 MG/1
200 TABLET, FILM COATED ORAL 3 TIMES DAILY PRN
Qty: 10 TABLET | Refills: 0 | Status: SHIPPED | OUTPATIENT
Start: 2021-11-04 | End: 2021-11-07

## 2021-11-04 RX ORDER — MAGNESIUM HYDROXIDE 1200 MG/15ML
LIQUID ORAL AS NEEDED
Status: DISCONTINUED | OUTPATIENT
Start: 2021-11-04 | End: 2021-11-04 | Stop reason: HOSPADM

## 2021-11-04 RX ORDER — LIDOCAINE HYDROCHLORIDE 10 MG/ML
INJECTION, SOLUTION EPIDURAL; INFILTRATION; INTRACAUDAL; PERINEURAL AS NEEDED
Status: DISCONTINUED | OUTPATIENT
Start: 2021-11-04 | End: 2021-11-04

## 2021-11-04 RX ORDER — CEFAZOLIN SODIUM 2 G/50ML
2000 SOLUTION INTRAVENOUS
Status: COMPLETED | OUTPATIENT
Start: 2021-11-04 | End: 2021-11-04

## 2021-11-04 RX ORDER — ONDANSETRON 2 MG/ML
INJECTION INTRAMUSCULAR; INTRAVENOUS AS NEEDED
Status: DISCONTINUED | OUTPATIENT
Start: 2021-11-04 | End: 2021-11-04

## 2021-11-04 RX ORDER — MIDAZOLAM HYDROCHLORIDE 2 MG/2ML
INJECTION, SOLUTION INTRAMUSCULAR; INTRAVENOUS AS NEEDED
Status: DISCONTINUED | OUTPATIENT
Start: 2021-11-04 | End: 2021-11-04

## 2021-11-04 RX ORDER — PROPOFOL 10 MG/ML
INJECTION, EMULSION INTRAVENOUS AS NEEDED
Status: DISCONTINUED | OUTPATIENT
Start: 2021-11-04 | End: 2021-11-04

## 2021-11-04 RX ORDER — SODIUM CHLORIDE, SODIUM LACTATE, POTASSIUM CHLORIDE, CALCIUM CHLORIDE 600; 310; 30; 20 MG/100ML; MG/100ML; MG/100ML; MG/100ML
125 INJECTION, SOLUTION INTRAVENOUS CONTINUOUS
Status: DISCONTINUED | OUTPATIENT
Start: 2021-11-04 | End: 2021-11-04 | Stop reason: HOSPADM

## 2021-11-04 RX ORDER — FENTANYL CITRATE 50 UG/ML
INJECTION, SOLUTION INTRAMUSCULAR; INTRAVENOUS AS NEEDED
Status: DISCONTINUED | OUTPATIENT
Start: 2021-11-04 | End: 2021-11-04

## 2021-11-04 RX ORDER — KETOROLAC TROMETHAMINE 30 MG/ML
INJECTION, SOLUTION INTRAMUSCULAR; INTRAVENOUS AS NEEDED
Status: DISCONTINUED | OUTPATIENT
Start: 2021-11-04 | End: 2021-11-04

## 2021-11-04 RX ORDER — HYDROMORPHONE HCL 110MG/55ML
PATIENT CONTROLLED ANALGESIA SYRINGE INTRAVENOUS AS NEEDED
Status: DISCONTINUED | OUTPATIENT
Start: 2021-11-04 | End: 2021-11-04

## 2021-11-04 RX ORDER — ACETAMINOPHEN 325 MG/1
975 TABLET ORAL ONCE
Status: COMPLETED | OUTPATIENT
Start: 2021-11-04 | End: 2021-11-04

## 2021-11-04 RX ORDER — ACETAMINOPHEN 325 MG/1
650 TABLET ORAL EVERY 4 HOURS PRN
Qty: 30 TABLET | Refills: 0
Start: 2021-11-04

## 2021-11-04 RX ORDER — CEPHALEXIN 500 MG/1
500 CAPSULE ORAL EVERY 6 HOURS SCHEDULED
Qty: 3 CAPSULE | Refills: 0 | Status: SHIPPED | OUTPATIENT
Start: 2021-11-04 | End: 2021-11-05

## 2021-11-04 RX ORDER — OXYBUTYNIN CHLORIDE 5 MG/1
5 TABLET ORAL EVERY 6 HOURS PRN
Qty: 30 TABLET | Refills: 0 | Status: SHIPPED | OUTPATIENT
Start: 2021-11-04

## 2021-11-04 RX ORDER — OXYCODONE HYDROCHLORIDE 5 MG/1
5 TABLET ORAL EVERY 4 HOURS PRN
Qty: 5 TABLET | Refills: 0 | Status: SHIPPED | OUTPATIENT
Start: 2021-11-04 | End: 2021-11-09

## 2021-11-04 RX ORDER — FENTANYL CITRATE/PF 50 MCG/ML
25 SYRINGE (ML) INJECTION
Status: DISCONTINUED | OUTPATIENT
Start: 2021-11-04 | End: 2021-11-04 | Stop reason: HOSPADM

## 2021-11-04 RX ORDER — DOCUSATE SODIUM 100 MG/1
100 CAPSULE, LIQUID FILLED ORAL 2 TIMES DAILY
Qty: 30 CAPSULE | Refills: 0 | Status: SHIPPED | OUTPATIENT
Start: 2021-11-04 | End: 2021-11-19

## 2021-11-04 RX ORDER — ONDANSETRON 2 MG/ML
4 INJECTION INTRAMUSCULAR; INTRAVENOUS ONCE AS NEEDED
Status: DISCONTINUED | OUTPATIENT
Start: 2021-11-04 | End: 2021-11-04 | Stop reason: HOSPADM

## 2021-11-04 RX ORDER — DEXAMETHASONE SODIUM PHOSPHATE 4 MG/ML
INJECTION, SOLUTION INTRA-ARTICULAR; INTRALESIONAL; INTRAMUSCULAR; INTRAVENOUS; SOFT TISSUE AS NEEDED
Status: DISCONTINUED | OUTPATIENT
Start: 2021-11-04 | End: 2021-11-04

## 2021-11-04 RX ORDER — OXYCODONE HYDROCHLORIDE 5 MG/1
5 TABLET ORAL EVERY 4 HOURS PRN
Status: DISCONTINUED | OUTPATIENT
Start: 2021-11-04 | End: 2021-11-04 | Stop reason: HOSPADM

## 2021-11-04 RX ADMIN — FENTANYL CITRATE 50 MCG: 50 INJECTION, SOLUTION INTRAMUSCULAR; INTRAVENOUS at 11:49

## 2021-11-04 RX ADMIN — HYDROMORPHONE HYDROCHLORIDE 0.5 MG: 2 INJECTION, SOLUTION INTRAMUSCULAR; INTRAVENOUS; SUBCUTANEOUS at 12:21

## 2021-11-04 RX ADMIN — LIDOCAINE HYDROCHLORIDE 50 MG: 10 INJECTION, SOLUTION EPIDURAL; INFILTRATION; INTRACAUDAL; PERINEURAL at 11:51

## 2021-11-04 RX ADMIN — SODIUM CHLORIDE, SODIUM LACTATE, POTASSIUM CHLORIDE, AND CALCIUM CHLORIDE 125 ML/HR: .6; .31; .03; .02 INJECTION, SOLUTION INTRAVENOUS at 11:26

## 2021-11-04 RX ADMIN — PROPOFOL 200 MG: 10 INJECTION, EMULSION INTRAVENOUS at 11:51

## 2021-11-04 RX ADMIN — HYDROMORPHONE HYDROCHLORIDE 0.5 MG: 2 INJECTION, SOLUTION INTRAMUSCULAR; INTRAVENOUS; SUBCUTANEOUS at 12:35

## 2021-11-04 RX ADMIN — HYDROMORPHONE HYDROCHLORIDE 0.5 MG: 2 INJECTION, SOLUTION INTRAMUSCULAR; INTRAVENOUS; SUBCUTANEOUS at 12:30

## 2021-11-04 RX ADMIN — CEFAZOLIN SODIUM 2000 MG: 2 SOLUTION INTRAVENOUS at 11:27

## 2021-11-04 RX ADMIN — ONDANSETRON 4 MG: 2 INJECTION INTRAMUSCULAR; INTRAVENOUS at 12:11

## 2021-11-04 RX ADMIN — MIDAZOLAM HYDROCHLORIDE 2 MG: 1 INJECTION, SOLUTION INTRAMUSCULAR; INTRAVENOUS at 11:44

## 2021-11-04 RX ADMIN — ACETAMINOPHEN 975 MG: 325 TABLET, FILM COATED ORAL at 11:28

## 2021-11-04 RX ADMIN — FENTANYL CITRATE 50 MCG: 50 INJECTION, SOLUTION INTRAMUSCULAR; INTRAVENOUS at 11:57

## 2021-11-04 RX ADMIN — KETOROLAC TROMETHAMINE 30 MG: 30 INJECTION, SOLUTION INTRAMUSCULAR at 11:44

## 2021-11-04 RX ADMIN — HYDROMORPHONE HYDROCHLORIDE 0.5 MG: 2 INJECTION, SOLUTION INTRAMUSCULAR; INTRAVENOUS; SUBCUTANEOUS at 12:11

## 2021-11-04 RX ADMIN — DEXAMETHASONE SODIUM PHOSPHATE 4 MG: 4 INJECTION, SOLUTION INTRAMUSCULAR; INTRAVENOUS at 12:00

## 2021-11-09 LAB
CA H2 PHOS DIHYD MFR STONE: 40 %
CALCIUM OXALATE DIHYDRATE MFR STONE IR: 20 %
COLOR STONE: NORMAL
COM MFR STONE: 40 %
COMMENT-STONE3: NORMAL
COMPOSITION: NORMAL
LABORATORY COMMENT REPORT: NORMAL
PHOTO: NORMAL
SIZE STONE: NORMAL MM
SPEC SOURCE SUBJ: NORMAL
STONE ANALYSIS-IMP: NORMAL
WT STONE: 49 MG

## 2021-11-12 ENCOUNTER — HOSPITAL ENCOUNTER (EMERGENCY)
Facility: HOSPITAL | Age: 46
Discharge: HOME | End: 2021-11-12
Attending: EMERGENCY MEDICINE | Admitting: EMERGENCY MEDICINE
Payer: COMMERCIAL

## 2021-11-12 ENCOUNTER — APPOINTMENT (EMERGENCY)
Dept: CT IMAGING | Facility: HOSPITAL | Age: 46
End: 2021-11-12
Payer: COMMERCIAL

## 2021-11-12 VITALS
HEART RATE: 79 BPM | SYSTOLIC BLOOD PRESSURE: 143 MMHG | OXYGEN SATURATION: 95 % | RESPIRATION RATE: 16 BRPM | DIASTOLIC BLOOD PRESSURE: 95 MMHG | TEMPERATURE: 98.6 F

## 2021-11-12 DIAGNOSIS — R31.9 HEMATURIA: Primary | ICD-10-CM

## 2021-11-12 LAB
ALBUMIN SERPL BCP-MCNC: 4.4 G/DL (ref 3.5–5)
ALP SERPL-CCNC: 68 U/L (ref 46–116)
ALT SERPL W P-5'-P-CCNC: 45 U/L (ref 12–78)
ANION GAP SERPL CALCULATED.3IONS-SCNC: 12 MMOL/L (ref 4–13)
APTT PPP: 31 SECONDS (ref 23–37)
AST SERPL W P-5'-P-CCNC: 30 U/L (ref 5–45)
ATRIAL RATE: 78 BPM
BACTERIA UR QL AUTO: ABNORMAL /HPF
BASOPHILS # BLD AUTO: 0.04 THOUSANDS/ΜL (ref 0–0.1)
BASOPHILS NFR BLD AUTO: 0 % (ref 0–1)
BILIRUB SERPL-MCNC: 0.21 MG/DL (ref 0.2–1)
BILIRUB UR QL STRIP: NEGATIVE
BUN SERPL-MCNC: 21 MG/DL (ref 5–25)
CALCIUM SERPL-MCNC: 8.9 MG/DL (ref 8.3–10.1)
CHLORIDE SERPL-SCNC: 102 MMOL/L (ref 100–108)
CLARITY UR: CLEAR
CO2 SERPL-SCNC: 23 MMOL/L (ref 21–32)
COLOR UR: ABNORMAL
CREAT SERPL-MCNC: 1.08 MG/DL (ref 0.6–1.3)
EOSINOPHIL # BLD AUTO: 0.16 THOUSAND/ΜL (ref 0–0.61)
EOSINOPHIL NFR BLD AUTO: 1 % (ref 0–6)
ERYTHROCYTE [DISTWIDTH] IN BLOOD BY AUTOMATED COUNT: 12.5 % (ref 11.6–15.1)
GFR SERPL CREATININE-BSD FRML MDRD: 83 ML/MIN/1.73SQ M
GLUCOSE SERPL-MCNC: 111 MG/DL (ref 65–140)
GLUCOSE UR STRIP-MCNC: NEGATIVE MG/DL
HCT VFR BLD AUTO: 44.4 % (ref 36.5–49.3)
HGB BLD-MCNC: 15 G/DL (ref 12–17)
HGB UR QL STRIP.AUTO: ABNORMAL
IMM GRANULOCYTES # BLD AUTO: 0.03 THOUSAND/UL (ref 0–0.2)
IMM GRANULOCYTES NFR BLD AUTO: 0 % (ref 0–2)
INR PPP: 0.91 (ref 0.84–1.19)
KETONES UR STRIP-MCNC: NEGATIVE MG/DL
LACTATE SERPL-SCNC: 0.7 MMOL/L (ref 0.5–2)
LEUKOCYTE ESTERASE UR QL STRIP: ABNORMAL
LIPASE SERPL-CCNC: 95 U/L (ref 73–393)
LYMPHOCYTES # BLD AUTO: 4.55 THOUSANDS/ΜL (ref 0.6–4.47)
LYMPHOCYTES NFR BLD AUTO: 39 % (ref 14–44)
MCH RBC QN AUTO: 30.1 PG (ref 26.8–34.3)
MCHC RBC AUTO-ENTMCNC: 33.8 G/DL (ref 31.4–37.4)
MCV RBC AUTO: 89 FL (ref 82–98)
MONOCYTES # BLD AUTO: 1.06 THOUSAND/ΜL (ref 0.17–1.22)
MONOCYTES NFR BLD AUTO: 9 % (ref 4–12)
NEUTROPHILS # BLD AUTO: 5.74 THOUSANDS/ΜL (ref 1.85–7.62)
NEUTS SEG NFR BLD AUTO: 51 % (ref 43–75)
NITRITE UR QL STRIP: NEGATIVE
NON-SQ EPI CELLS URNS QL MICRO: ABNORMAL /HPF
NRBC BLD AUTO-RTO: 0 /100 WBCS
P AXIS: 87 DEGREES
PH UR STRIP.AUTO: 5.5 [PH]
PLATELET # BLD AUTO: 314 THOUSANDS/UL (ref 149–390)
PMV BLD AUTO: 9.9 FL (ref 8.9–12.7)
POTASSIUM SERPL-SCNC: 3.6 MMOL/L (ref 3.5–5.3)
PR INTERVAL: 148 MS
PROT SERPL-MCNC: 7.6 G/DL (ref 6.4–8.2)
PROT UR STRIP-MCNC: ABNORMAL MG/DL
PROTHROMBIN TIME: 12.3 SECONDS (ref 11.6–14.5)
QRS AXIS: 61 DEGREES
QRSD INTERVAL: 100 MS
QT INTERVAL: 380 MS
QTC INTERVAL: 433 MS
RBC # BLD AUTO: 4.99 MILLION/UL (ref 3.88–5.62)
RBC #/AREA URNS AUTO: ABNORMAL /HPF
SODIUM SERPL-SCNC: 137 MMOL/L (ref 136–145)
SP GR UR STRIP.AUTO: 1.02 (ref 1–1.03)
T WAVE AXIS: 34 DEGREES
UROBILINOGEN UR QL STRIP.AUTO: 0.2 E.U./DL
VENTRICULAR RATE: 78 BPM
WBC # BLD AUTO: 11.58 THOUSAND/UL (ref 4.31–10.16)
WBC #/AREA URNS AUTO: ABNORMAL /HPF

## 2021-11-12 PROCEDURE — 99284 EMERGENCY DEPT VISIT MOD MDM: CPT | Performed by: EMERGENCY MEDICINE

## 2021-11-12 PROCEDURE — 96360 HYDRATION IV INFUSION INIT: CPT

## 2021-11-12 PROCEDURE — 93010 ELECTROCARDIOGRAM REPORT: CPT | Performed by: INTERNAL MEDICINE

## 2021-11-12 PROCEDURE — 83690 ASSAY OF LIPASE: CPT | Performed by: EMERGENCY MEDICINE

## 2021-11-12 PROCEDURE — 80053 COMPREHEN METABOLIC PANEL: CPT | Performed by: EMERGENCY MEDICINE

## 2021-11-12 PROCEDURE — 85025 COMPLETE CBC W/AUTO DIFF WBC: CPT | Performed by: EMERGENCY MEDICINE

## 2021-11-12 PROCEDURE — 74176 CT ABD & PELVIS W/O CONTRAST: CPT

## 2021-11-12 PROCEDURE — 36415 COLL VENOUS BLD VENIPUNCTURE: CPT | Performed by: EMERGENCY MEDICINE

## 2021-11-12 PROCEDURE — 96361 HYDRATE IV INFUSION ADD-ON: CPT

## 2021-11-12 PROCEDURE — 81001 URINALYSIS AUTO W/SCOPE: CPT | Performed by: EMERGENCY MEDICINE

## 2021-11-12 PROCEDURE — 85730 THROMBOPLASTIN TIME PARTIAL: CPT | Performed by: EMERGENCY MEDICINE

## 2021-11-12 PROCEDURE — 99284 EMERGENCY DEPT VISIT MOD MDM: CPT

## 2021-11-12 PROCEDURE — 85610 PROTHROMBIN TIME: CPT | Performed by: EMERGENCY MEDICINE

## 2021-11-12 PROCEDURE — 93005 ELECTROCARDIOGRAM TRACING: CPT

## 2021-11-12 PROCEDURE — 83605 ASSAY OF LACTIC ACID: CPT | Performed by: EMERGENCY MEDICINE

## 2021-11-12 RX ORDER — TAMSULOSIN HYDROCHLORIDE 0.4 MG/1
0.4 CAPSULE ORAL
Qty: 10 CAPSULE | Refills: 0 | Status: SHIPPED | OUTPATIENT
Start: 2021-11-12

## 2021-11-12 RX ORDER — ONDANSETRON 2 MG/ML
4 INJECTION INTRAMUSCULAR; INTRAVENOUS ONCE
Status: DISCONTINUED | OUTPATIENT
Start: 2021-11-12 | End: 2021-11-12 | Stop reason: HOSPADM

## 2021-11-12 RX ORDER — CEPHALEXIN 500 MG/1
500 CAPSULE ORAL EVERY 12 HOURS SCHEDULED
Qty: 10 CAPSULE | Refills: 0 | Status: SHIPPED | OUTPATIENT
Start: 2021-11-12 | End: 2021-11-17

## 2021-11-12 RX ORDER — ACETAMINOPHEN 325 MG/1
650 TABLET ORAL ONCE
Status: DISCONTINUED | OUTPATIENT
Start: 2021-11-12 | End: 2021-11-12 | Stop reason: HOSPADM

## 2021-11-12 RX ORDER — CEPHALEXIN 250 MG/1
500 CAPSULE ORAL ONCE
Status: COMPLETED | OUTPATIENT
Start: 2021-11-12 | End: 2021-11-12

## 2021-11-12 RX ORDER — IBUPROFEN 200 MG
200 TABLET ORAL EVERY 6 HOURS
Qty: 8 TABLET | Refills: 0 | Status: SHIPPED | OUTPATIENT
Start: 2021-11-12 | End: 2021-11-14

## 2021-11-12 RX ORDER — PHENAZOPYRIDINE HYDROCHLORIDE 200 MG/1
200 TABLET, FILM COATED ORAL 3 TIMES DAILY PRN
Qty: 6 TABLET | Refills: 0 | Status: SHIPPED | OUTPATIENT
Start: 2021-11-12 | End: 2021-11-14

## 2021-11-12 RX ORDER — KETOROLAC TROMETHAMINE 30 MG/ML
15 INJECTION, SOLUTION INTRAMUSCULAR; INTRAVENOUS ONCE
Status: DISCONTINUED | OUTPATIENT
Start: 2021-11-12 | End: 2021-11-12 | Stop reason: HOSPADM

## 2021-11-12 RX ADMIN — SODIUM CHLORIDE 1000 ML: 0.9 INJECTION, SOLUTION INTRAVENOUS at 01:35

## 2021-11-12 RX ADMIN — CEPHALEXIN 500 MG: 250 CAPSULE ORAL at 04:42

## 2021-12-21 ENCOUNTER — IMMUNIZATIONS (OUTPATIENT)
Dept: FAMILY MEDICINE CLINIC | Facility: HOSPITAL | Age: 46
End: 2021-12-21

## 2021-12-21 ENCOUNTER — OFFICE VISIT (OUTPATIENT)
Dept: FAMILY MEDICINE CLINIC | Facility: CLINIC | Age: 46
End: 2021-12-21
Payer: COMMERCIAL

## 2021-12-21 VITALS
HEIGHT: 70 IN | SYSTOLIC BLOOD PRESSURE: 142 MMHG | TEMPERATURE: 97.8 F | BODY MASS INDEX: 34.3 KG/M2 | WEIGHT: 239.6 LBS | DIASTOLIC BLOOD PRESSURE: 78 MMHG | HEART RATE: 78 BPM | OXYGEN SATURATION: 98 %

## 2021-12-21 DIAGNOSIS — Z23 ENCOUNTER FOR IMMUNIZATION: Primary | ICD-10-CM

## 2021-12-21 DIAGNOSIS — F43.0 ACUTE STRESS REACTION: ICD-10-CM

## 2021-12-21 DIAGNOSIS — F41.9 ANXIETY: Primary | ICD-10-CM

## 2021-12-21 DIAGNOSIS — F33.9 DEPRESSION, RECURRENT (HCC): ICD-10-CM

## 2021-12-21 PROCEDURE — 3008F BODY MASS INDEX DOCD: CPT | Performed by: FAMILY MEDICINE

## 2021-12-21 PROCEDURE — 91300 COVID-19 PFIZER VACC 0.3 ML: CPT | Performed by: PEDIATRICS

## 2021-12-21 PROCEDURE — 0001A COVID-19 PFIZER VACC 0.3 ML: CPT | Performed by: PEDIATRICS

## 2021-12-21 PROCEDURE — 3725F SCREEN DEPRESSION PERFORMED: CPT | Performed by: FAMILY MEDICINE

## 2021-12-21 PROCEDURE — 99214 OFFICE O/P EST MOD 30 MIN: CPT | Performed by: FAMILY MEDICINE

## 2021-12-21 RX ORDER — LORAZEPAM 0.5 MG/1
0.5 TABLET ORAL 2 TIMES DAILY PRN
Qty: 30 TABLET | Refills: 0 | Status: SHIPPED | OUTPATIENT
Start: 2021-12-21

## 2021-12-21 RX ORDER — HYDROXYZINE HYDROCHLORIDE 10 MG/1
10 TABLET, FILM COATED ORAL EVERY 6 HOURS PRN
Qty: 30 TABLET | Refills: 0 | Status: SHIPPED | OUTPATIENT
Start: 2021-12-21

## 2022-03-15 ENCOUNTER — RA CDI HCC (OUTPATIENT)
Dept: OTHER | Facility: HOSPITAL | Age: 47
End: 2022-03-15

## 2022-03-15 NOTE — PROGRESS NOTES
[facility]  coding opportunities       Chart reviewed, no opportunity found:   [address]        Patients Insurance     Medicare Insurance: [employer]

## 2022-07-26 ENCOUNTER — OFFICE VISIT (OUTPATIENT)
Dept: FAMILY MEDICINE CLINIC | Facility: CLINIC | Age: 47
End: 2022-07-26
Payer: COMMERCIAL

## 2022-07-26 VITALS
HEIGHT: 70 IN | RESPIRATION RATE: 16 BRPM | BODY MASS INDEX: 32.93 KG/M2 | WEIGHT: 230 LBS | DIASTOLIC BLOOD PRESSURE: 86 MMHG | OXYGEN SATURATION: 96 % | SYSTOLIC BLOOD PRESSURE: 126 MMHG | TEMPERATURE: 97.7 F | HEART RATE: 81 BPM

## 2022-07-26 DIAGNOSIS — F41.9 ANXIETY: ICD-10-CM

## 2022-07-26 DIAGNOSIS — E66.09 CLASS 2 OBESITY DUE TO EXCESS CALORIES WITHOUT SERIOUS COMORBIDITY WITH BODY MASS INDEX (BMI) OF 36.0 TO 36.9 IN ADULT: ICD-10-CM

## 2022-07-26 DIAGNOSIS — Z12.11 SCREEN FOR COLON CANCER: ICD-10-CM

## 2022-07-26 DIAGNOSIS — R30.0 DYSURIA: Primary | ICD-10-CM

## 2022-07-26 PROCEDURE — 99214 OFFICE O/P EST MOD 30 MIN: CPT | Performed by: FAMILY MEDICINE

## 2022-07-26 PROCEDURE — 3725F SCREEN DEPRESSION PERFORMED: CPT | Performed by: FAMILY MEDICINE

## 2022-07-26 NOTE — PROGRESS NOTES
Assessment/Plan:    Anxiety  Patient reports worsening control of anxiety  Ativan, Atarax as and help with symptoms    Class 2 obesity due to excess calories without serious comorbidity with body mass index (BMI) of 36 0 to 36 9 in adult  Obtain baseline blood work in preparation for annual physical    Dysuria  Patient have 4 day history of some dysuria symptoms, foul-smelling urine on the 1st day  Concern for possible UTI versus prostate issues  Will obtain urine culture and urinalysis for evaluation  If positive, start patient on antibiotic for treatment  If negative, consider further evaluation with ultrasound to characterize volume prostate  Of note, patient's symptom may be related to the presence of the stent secondary to kidney stone      Subjective:      Patient ID: Dayne Oreilly is a 41 y o  male  HPI    47-year-old male patient presents for concern regarding possible prostate abnormality  Patient was last seen on 12/21/2021 for anxiety  Patient does have a history of kidney stone, was treated with cystoscopy on 11/04/2021  Patient reports in the last 4 days he has some mild spasm in his bladder/prostate  The 1st day patient did have some concentrated/foul-smelling urine but denies any persistent change  No visible blood  Patient does have occasions sensation of tightness when urinating  Of note, patient has use Flomax in the past, had difficulty with ejaculation with this medication  Would like to hold off the medication if possible  Denies any high risk sexual activity  Denies any discharge from the penis  Anxiety is fluctuating  Review of Systems   Constitutional: Negative for chills and fever  HENT: Negative for congestion, rhinorrhea and sore throat  Respiratory: Negative for shortness of breath  Cardiovascular: Negative for chest pain  Gastrointestinal: Negative for abdominal pain     Genitourinary: Negative for dysuria, frequency, penile discharge, penile pain, penile swelling, scrotal swelling and testicular pain  Occasional sensation of "tightness" with urine  Spasms the last 4 days  Strong smell the first day of urine   Neurological: Negative for headaches  Psychiatric/Behavioral: Negative for sleep disturbance  Objective:    /86 (BP Location: Left arm, Patient Position: Sitting, Cuff Size: Large)   Pulse 81   Temp 97 7 °F (36 5 °C) (Temporal)   Resp 16   Ht 5' 10" (1 778 m)   Wt 104 kg (230 lb)   SpO2 96%   BMI 33 00 kg/m²     Physical Exam  Vitals reviewed  Constitutional:       General: He is not in acute distress  Appearance: Normal appearance  He is obese  He is not ill-appearing, toxic-appearing or diaphoretic  Cardiovascular:      Rate and Rhythm: Normal rate and regular rhythm  Pulses: Normal pulses  Pulmonary:      Effort: Pulmonary effort is normal       Breath sounds: Normal breath sounds  Abdominal:      General: Abdomen is flat  Musculoskeletal:         General: No swelling, tenderness, deformity or signs of injury  Normal range of motion  Skin:     General: Skin is warm  Capillary Refill: Capillary refill takes less than 2 seconds  Neurological:      General: No focal deficit present  Mental Status: He is alert  Psychiatric:         Mood and Affect: Mood normal           AARON Strauss  Family Medicine    Please excuse any "sound-alike" errors that may have ocurred during the process of dictation  Parts of this note have been dictated and there may be errors present in the transcription process  Thank you

## 2022-07-26 NOTE — ASSESSMENT & PLAN NOTE
Patient have 4 day history of some dysuria symptoms, foul-smelling urine on the 1st day  Concern for possible UTI versus prostate issues  Will obtain urine culture and urinalysis for evaluation  If positive, start patient on antibiotic for treatment    If negative, consider further evaluation with ultrasound to characterize volume prostate  Of note, patient's symptom may be related to the presence of the stent secondary to kidney stone

## 2022-07-27 ENCOUNTER — APPOINTMENT (OUTPATIENT)
Dept: LAB | Facility: MEDICAL CENTER | Age: 47
End: 2022-07-27
Payer: COMMERCIAL

## 2022-07-27 DIAGNOSIS — E66.09 CLASS 2 OBESITY DUE TO EXCESS CALORIES WITHOUT SERIOUS COMORBIDITY WITH BODY MASS INDEX (BMI) OF 36.0 TO 36.9 IN ADULT: ICD-10-CM

## 2022-07-27 DIAGNOSIS — F41.9 ANXIETY: ICD-10-CM

## 2022-07-27 DIAGNOSIS — R30.0 DYSURIA: ICD-10-CM

## 2022-07-27 LAB
BACTERIA UR QL AUTO: ABNORMAL /HPF
BASOPHILS # BLD AUTO: 0.02 THOUSANDS/ΜL (ref 0–0.1)
BASOPHILS NFR BLD AUTO: 0 % (ref 0–1)
BILIRUB UR QL STRIP: NEGATIVE
CLARITY UR: CLEAR
COLOR UR: ABNORMAL
EOSINOPHIL # BLD AUTO: 0.08 THOUSAND/ΜL (ref 0–0.61)
EOSINOPHIL NFR BLD AUTO: 1 % (ref 0–6)
ERYTHROCYTE [DISTWIDTH] IN BLOOD BY AUTOMATED COUNT: 12.2 % (ref 11.6–15.1)
GLUCOSE UR STRIP-MCNC: NEGATIVE MG/DL
HCT VFR BLD AUTO: 46.8 % (ref 36.5–49.3)
HGB BLD-MCNC: 15.6 G/DL (ref 12–17)
HGB UR QL STRIP.AUTO: ABNORMAL
IMM GRANULOCYTES # BLD AUTO: 0.01 THOUSAND/UL (ref 0–0.2)
IMM GRANULOCYTES NFR BLD AUTO: 0 % (ref 0–2)
KETONES UR STRIP-MCNC: NEGATIVE MG/DL
LEUKOCYTE ESTERASE UR QL STRIP: NEGATIVE
LYMPHOCYTES # BLD AUTO: 3.17 THOUSANDS/ΜL (ref 0.6–4.47)
LYMPHOCYTES NFR BLD AUTO: 49 % (ref 14–44)
MCH RBC QN AUTO: 30.5 PG (ref 26.8–34.3)
MCHC RBC AUTO-ENTMCNC: 33.3 G/DL (ref 31.4–37.4)
MCV RBC AUTO: 91 FL (ref 82–98)
MONOCYTES # BLD AUTO: 0.63 THOUSAND/ΜL (ref 0.17–1.22)
MONOCYTES NFR BLD AUTO: 10 % (ref 4–12)
MUCOUS THREADS UR QL AUTO: ABNORMAL
NEUTROPHILS # BLD AUTO: 2.58 THOUSANDS/ΜL (ref 1.85–7.62)
NEUTS SEG NFR BLD AUTO: 40 % (ref 43–75)
NITRITE UR QL STRIP: NEGATIVE
NON-SQ EPI CELLS URNS QL MICRO: ABNORMAL /HPF
NRBC BLD AUTO-RTO: 0 /100 WBCS
PH UR STRIP.AUTO: 6.5 [PH]
PLATELET # BLD AUTO: 301 THOUSANDS/UL (ref 149–390)
PMV BLD AUTO: 10.6 FL (ref 8.9–12.7)
PROT UR STRIP-MCNC: NEGATIVE MG/DL
RBC # BLD AUTO: 5.12 MILLION/UL (ref 3.88–5.62)
RBC #/AREA URNS AUTO: ABNORMAL /HPF
SP GR UR STRIP.AUTO: 1.02 (ref 1–1.03)
TSH SERPL DL<=0.05 MIU/L-ACNC: 1.56 UIU/ML (ref 0.45–4.5)
UROBILINOGEN UR STRIP-ACNC: <2 MG/DL
WBC # BLD AUTO: 6.49 THOUSAND/UL (ref 4.31–10.16)
WBC #/AREA URNS AUTO: ABNORMAL /HPF

## 2022-07-27 PROCEDURE — 81001 URINALYSIS AUTO W/SCOPE: CPT | Performed by: FAMILY MEDICINE

## 2022-07-27 PROCEDURE — 85025 COMPLETE CBC W/AUTO DIFF WBC: CPT

## 2022-07-27 PROCEDURE — 84443 ASSAY THYROID STIM HORMONE: CPT

## 2022-07-27 PROCEDURE — 36415 COLL VENOUS BLD VENIPUNCTURE: CPT

## 2022-07-27 PROCEDURE — 87086 URINE CULTURE/COLONY COUNT: CPT

## 2022-07-28 LAB — BACTERIA UR CULT: NORMAL

## 2023-01-11 ENCOUNTER — TELEPHONE (OUTPATIENT)
Dept: ADMINISTRATIVE | Facility: OTHER | Age: 48
End: 2023-01-11

## 2023-01-11 NOTE — TELEPHONE ENCOUNTER
01/11/23 1:36 PM    The patient was called and reminded about the open Cologuard order  Instructed to call the ordering provider's office if there are any questions about completing the CRC screen  Thank you    Jennifer Bowman MA  [facility]

## 2023-05-16 ENCOUNTER — TELEPHONE (OUTPATIENT)
Dept: FAMILY MEDICINE CLINIC | Facility: CLINIC | Age: 48
End: 2023-05-16

## 2023-05-16 DIAGNOSIS — Z12.11 ENCOUNTER FOR SCREENING FOR MALIGNANT NEOPLASM OF COLON: Primary | ICD-10-CM

## 2023-05-16 NOTE — TELEPHONE ENCOUNTER
"Spoke with patient about open cologuard order from July  Stated he's opened it, looked at it, but has had a lot going on with life and with his job  We discussed FIT kit as another option for the screening  Due for appt with PCP as well  When job settles, he will schedule in and discuss options more in depth  Possibly FIT will be a better \"fit\" for him     "

## 2023-08-22 ENCOUNTER — OFFICE VISIT (OUTPATIENT)
Dept: FAMILY MEDICINE CLINIC | Facility: CLINIC | Age: 48
End: 2023-08-22
Payer: COMMERCIAL

## 2023-08-22 VITALS
HEIGHT: 70 IN | BODY MASS INDEX: 32.93 KG/M2 | OXYGEN SATURATION: 97 % | TEMPERATURE: 97.6 F | SYSTOLIC BLOOD PRESSURE: 120 MMHG | DIASTOLIC BLOOD PRESSURE: 74 MMHG | WEIGHT: 230 LBS | HEART RATE: 72 BPM

## 2023-08-22 DIAGNOSIS — R21 SKIN RASH: Primary | ICD-10-CM

## 2023-08-22 PROCEDURE — 99214 OFFICE O/P EST MOD 30 MIN: CPT | Performed by: FAMILY MEDICINE

## 2023-08-22 RX ORDER — MOMETASONE FUROATE 1 MG/G
OINTMENT TOPICAL DAILY
Qty: 45 G | Refills: 1 | Status: SHIPPED | OUTPATIENT
Start: 2023-08-22
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
## 2023-11-27 ENCOUNTER — APPOINTMENT (OUTPATIENT)
Dept: RADIOLOGY | Facility: MEDICAL CENTER | Age: 48
End: 2023-11-27
Payer: COMMERCIAL

## 2023-11-27 ENCOUNTER — OFFICE VISIT (OUTPATIENT)
Dept: FAMILY MEDICINE CLINIC | Facility: CLINIC | Age: 48
End: 2023-11-27
Payer: COMMERCIAL

## 2023-11-27 VITALS
SYSTOLIC BLOOD PRESSURE: 132 MMHG | TEMPERATURE: 97.7 F | RESPIRATION RATE: 16 BRPM | DIASTOLIC BLOOD PRESSURE: 84 MMHG | OXYGEN SATURATION: 97 % | BODY MASS INDEX: 33.5 KG/M2 | WEIGHT: 234 LBS | HEART RATE: 99 BPM | HEIGHT: 70 IN

## 2023-11-27 DIAGNOSIS — F41.9 ANXIETY: ICD-10-CM

## 2023-11-27 DIAGNOSIS — M54.42 CHRONIC LEFT-SIDED LOW BACK PAIN WITH LEFT-SIDED SCIATICA: ICD-10-CM

## 2023-11-27 DIAGNOSIS — F43.0 ACUTE STRESS REACTION: ICD-10-CM

## 2023-11-27 DIAGNOSIS — M51.16 INTERVERTEBRAL DISC DISORDER WITH RADICULOPATHY OF LUMBAR REGION: ICD-10-CM

## 2023-11-27 DIAGNOSIS — G89.29 CHRONIC LEFT-SIDED LOW BACK PAIN WITH LEFT-SIDED SCIATICA: Primary | ICD-10-CM

## 2023-11-27 DIAGNOSIS — M51.26 LUMBAR DISC HERNIATION: ICD-10-CM

## 2023-11-27 DIAGNOSIS — M54.42 CHRONIC LEFT-SIDED LOW BACK PAIN WITH LEFT-SIDED SCIATICA: Primary | ICD-10-CM

## 2023-11-27 DIAGNOSIS — G89.29 CHRONIC LEFT-SIDED LOW BACK PAIN WITH LEFT-SIDED SCIATICA: ICD-10-CM

## 2023-11-27 DIAGNOSIS — M54.16 RADICULOPATHY, LUMBAR REGION: ICD-10-CM

## 2023-11-27 PROCEDURE — 99214 OFFICE O/P EST MOD 30 MIN: CPT | Performed by: FAMILY MEDICINE

## 2023-11-27 PROCEDURE — 72110 X-RAY EXAM L-2 SPINE 4/>VWS: CPT

## 2023-11-27 RX ORDER — DULOXETIN HYDROCHLORIDE 30 MG/1
30 CAPSULE, DELAYED RELEASE ORAL DAILY
Qty: 90 CAPSULE | Refills: 3 | Status: SHIPPED | OUTPATIENT
Start: 2023-11-27

## 2023-11-27 RX ORDER — HYDROXYZINE HYDROCHLORIDE 25 MG/1
25 TABLET, FILM COATED ORAL EVERY 6 HOURS PRN
Qty: 60 TABLET | Refills: 1 | Status: SHIPPED | OUTPATIENT
Start: 2023-11-27

## 2023-11-27 RX ORDER — PREDNISONE 20 MG/1
40 TABLET ORAL DAILY
Qty: 10 TABLET | Refills: 0 | Status: SHIPPED | OUTPATIENT
Start: 2023-11-27 | End: 2023-12-02

## 2023-11-27 RX ORDER — LORAZEPAM 0.5 MG/1
0.5 TABLET ORAL 2 TIMES DAILY PRN
Qty: 60 TABLET | Refills: 0 | Status: SHIPPED | OUTPATIENT
Start: 2023-11-27

## 2023-11-27 RX ORDER — CYCLOBENZAPRINE HCL 10 MG
10 TABLET ORAL 3 TIMES DAILY PRN
Qty: 60 TABLET | Refills: 1 | Status: SHIPPED | OUTPATIENT
Start: 2023-11-27

## 2023-11-27 NOTE — PROGRESS NOTES
Name: Mercy Ponce      : 1975      MRN: [id number]  Encounter Provider: Frederick Mehta MD  Encounter Date: 2023   Encounter department: [facility]     1. Chronic left-sided low back pain with left-sided sciatica  -     Ambulatory Referral to Comprehensive Spine Program; Future  -     predniSONE 20 mg tablet; Take 2 tablets (40 mg total) by mouth daily for 5 days  -     cyclobenzaprine (FLEXERIL) 10 mg tablet; Take 1 tablet (10 mg total) by mouth 3 (three) times a day as needed for muscle spasms  -     Ambulatory Referral to Physical Therapy; Future  -     XR spine lumbar minimum 4 views non injury; Future; Expected date: 2023  -     DULoxetine (CYMBALTA) 30 mg delayed release capsule; Take 1 capsule (30 mg total) by mouth daily    2. Intervertebral disc disorder with radiculopathy of lumbar region  -     Ambulatory Referral to Comprehensive Spine Program; Future  -     predniSONE 20 mg tablet; Take 2 tablets (40 mg total) by mouth daily for 5 days  -     cyclobenzaprine (FLEXERIL) 10 mg tablet; Take 1 tablet (10 mg total) by mouth 3 (three) times a day as needed for muscle spasms  -     Ambulatory Referral to Physical Therapy; Future  -     XR spine lumbar minimum 4 views non injury; Future; Expected date: 2023  -     DULoxetine (CYMBALTA) 30 mg delayed release capsule; Take 1 capsule (30 mg total) by mouth daily    3. Radiculopathy, lumbar region  -     Ambulatory Referral to Comprehensive Spine Program; Future  -     predniSONE 20 mg tablet; Take 2 tablets (40 mg total) by mouth daily for 5 days  -     cyclobenzaprine (FLEXERIL) 10 mg tablet; Take 1 tablet (10 mg total) by mouth 3 (three) times a day as needed for muscle spasms  -     Ambulatory Referral to Physical Therapy; Future  -     XR spine lumbar minimum 4 views non injury; Future; Expected date: 2023  -     DULoxetine (CYMBALTA) 30 mg delayed release capsule;  Take 1 capsule (30 mg total) by mouth daily    4. Lumbar disc herniation  -     Ambulatory Referral to Comprehensive Spine Program; Future  -     predniSONE 20 mg tablet; Take 2 tablets (40 mg total) by mouth daily for 5 days  -     cyclobenzaprine (FLEXERIL) 10 mg tablet; Take 1 tablet (10 mg total) by mouth 3 (three) times a day as needed for muscle spasms  -     Ambulatory Referral to Physical Therapy; Future  -     XR spine lumbar minimum 4 views non injury; Future; Expected date: 11/27/2023  -     DULoxetine (CYMBALTA) 30 mg delayed release capsule; Take 1 capsule (30 mg total) by mouth daily    5. Anxiety  -     hydrOXYzine HCL (ATARAX) 25 mg tablet; Take 1 tablet (25 mg total) by mouth every 6 (six) hours as needed for itching  -     DULoxetine (CYMBALTA) 30 mg delayed release capsule; Take 1 capsule (30 mg total) by mouth daily    6. Acute stress reaction  Comments:  Refill lorazepam.  SSRI treatment declined today  Orders:  -     LORazepam (ATIVAN) 0.5 mg tablet; Take 1 tablet (0.5 mg total) by mouth 2 (two) times a day as needed for anxiety         Patient with acute worsening of his chronic lumbar back pain. Felt a popping sensation while squatting down, now experiencing severe pain with twisting and turning of his lumbar spine. Pain is interfering with his emotional health, has difficulty sleeping as well as severe anxiety due to the pain.   Will trial patient on prednisone, Flexeril for musculoskeletal symptoms  Patient may continue using Tylenol and ibuprofen over-the-counter for pain  The x-ray of the lumbar spine for evaluation  Once pain is improving patient may see complaints of spinal and physical therapy  Very likely patient will benefit from MRI for evaluation    For patient's anxiety we will start Cymbalta which will help with both chronic lumbar back pain as well as anxiety  As needed patient may use Atarax 25 mg up to every 6 hours    If there is severe anxiety patient may use Ativan 0.5 mg up to twice a day, patient understands that this medication causes drowsiness and should be avoided if possible  (Especially together with Flexeril)    Will provide documentation the patient will be out of work until next Monday, light duty until January 2      Subjective     HPI    47-year-old male patient presents for evaluation regarding worsening lumbar back pain. Patient does have a history of left-sided lumbar back pain in the past, reports symptom has been ongoing however on Saturday he was squatting and felt a popping sensation and since then has being experiencing severe pain. Patient reports at a neutral sitting position pain is about 3 out of 10 pain however moving the wrong way feels like "I get tasered". Pt reports periodic numbness. Denies any drop foot. Feels tight on the lumbar back, limited range of motion. Denies any bladder or bowl incontinence. Review of Systems   Constitutional:  Negative for chills and fever. HENT:  Negative for congestion, rhinorrhea and sore throat. Respiratory:  Negative for shortness of breath. Cardiovascular:  Negative for chest pain. Gastrointestinal:  Negative for abdominal pain. Musculoskeletal:  Positive for arthralgias and back pain. Neurological:  Positive for numbness. Negative for weakness.        Past Medical History:   Diagnosis Date    Anxiety     Back pain     Chronic pain disorder     back    Depression     Kidney stone     Sciatica     Wears glasses      Past Surgical History:   Procedure Laterality Date    FL RETROGRADE PYELOGRAM  11/4/2021    KIDNEY STONE SURGERY      [place] CYSTO/URETERO W/LITHOTRIPSY &INDWELL STENT INSRT Left 11/4/2021    Procedure: CYSTOSCOPY URETEROSCOPY WITH LITHOTRIPSY HOLMIUM LASER, basket stone extraction, RETROGRADE PYELOGRAM AND INSERTION STENT URETERAL;  Surgeon: Rachelle Denis MD;  Location: [facility];  Service: Urology     Family History   Problem Relation Age of Onset    Psychiatric Illness Mother     No Known Problems Father      Social History     Socioeconomic History    Marital status: Single     Spouse name: None    Number of children: None    Years of education: None    Highest education level: None   Occupational History    None   Tobacco Use    Smoking status: Former     Packs/day: 2.00     Years: 15.00     Total pack years: 30.00     Types: Cigarettes     Start date: 14     Quit date: 2011     Years since quittin.3    Smokeless tobacco: Never    Tobacco comments:     quit smoking x11yrs ago   Vaping Use    Vaping Use: Every day    Start date: 2011    Substances: Nicotine, THC, CBD, Flavoring   Substance and Sexual Activity    Alcohol use: No    Drug use: Yes     Types: Marijuana     Comment: daily- vape- yesterday     Sexual activity: None   Other Topics Concern    None   Social History Narrative    None     Social Determinants of Health     Financial Resource Strain: Not on file   Food Insecurity: Not on file   Transportation Needs: Not on file   Physical Activity: Not on file   Stress: Not on file   Social Connections: Not on file   Intimate Partner Violence: Not on file   Housing Stability: Not on file     Current Outpatient Medications on File Prior to Visit   Medication Sig    Acetaminophen (TYLENOL PO) Take by mouth      ibuprofen (MOTRIN) 200 mg tablet Take 1 tablet (200 mg total) by mouth every 6 (six) hours for 2 days    mometasone (ELOCON) 0.1 % ointment Apply topically daily    acetaminophen (TYLENOL) 325 mg tablet Take 2 tablets (650 mg total) by mouth every 4 (four) hours as needed for mild pain (Patient not taking: Reported on 2023)    diclofenac sodium (VOLTAREN) 50 mg EC tablet Take 1 tablet (50 mg total) by mouth 3 (three) times a day for 7 days (Patient not taking: Reported on 2022)    docusate sodium (COLACE) 100 mg capsule Take 1 capsule (100 mg total) by mouth 2 (two) times a day for 15 days (Patient not taking: Reported on 2023)    oxybutynin (DITROPAN) 5 mg tablet Take 1 tablet (5 mg total) by mouth every 6 (six) hours as needed (bladder spasms) (Patient not taking: Reported on 12/21/2021)    tamsulosin (FLOMAX) 0.4 mg Take 1 capsule (0.4 mg total) by mouth daily with dinner (Patient not taking: Reported on 12/21/2021)    [DISCONTINUED] hydrOXYzine HCL (ATARAX) 10 mg tablet Take 1 tablet (10 mg total) by mouth every 6 (six) hours as needed for anxiety (Patient not taking: Reported on 7/26/2022)    [DISCONTINUED] LORazepam (ATIVAN) 0.5 mg tablet Take 1 tablet (0.5 mg total) by mouth 2 (two) times a day as needed for anxiety (Patient not taking: Reported on 7/26/2022)     No Known Allergies  Immunization History   Administered Date(s) Administered    COVID-19 PFIZER VACCINE 0.3 ML IM 04/13/2021, 05/04/2021, 12/21/2021    Influenza, injectable, quadrivalent, preservative free 0.5 mL 12/23/2020       Objective     /84 (BP Location: Right arm, Patient Position: Sitting, Cuff Size: Large)   Pulse 99   Temp 97.7 °F (36.5 °C) (Temporal)   Resp 16   Ht 5' 10" (1.778 m)   Wt 106 kg (234 lb)   SpO2 97%   BMI 33.58 kg/m²     Physical Exam  Vitals reviewed. Constitutional:       Comments: Pt appears anxious and in distress   Cardiovascular:      Rate and Rhythm: Normal rate and regular rhythm. Pulses: Normal pulses. Heart sounds: Normal heart sounds. No murmur heard. Pulmonary:      Effort: Pulmonary effort is normal. No respiratory distress. Breath sounds: Normal breath sounds. Abdominal:      General: Abdomen is flat. Musculoskeletal:         General: Tenderness present. No swelling or deformity. Comments: Tightness around the paraspinal muscles  Exam limited due to pain   Skin:     General: Skin is warm and dry. Capillary Refill: Capillary refill takes less than 2 seconds. Coloration: Skin is not jaundiced. Neurological:      General: No focal deficit present. Mental Status: He is alert.    Psychiatric:      Comments: Anxious, stressed         Asim Brady MD

## 2023-11-27 NOTE — LETTER
November 27, 2023     Patient: Kimberly Andrade  YOB: 1975  Date of Visit: 11/27/2023      To Whom it May Concern:    Kimberly Andrade is under my professional care. Marina Bowser was seen in my office on 11/27/2023. Marina Bowser may return to work on 12/4/23 . Patient will be on light duty until 1/2/24. No heavy lifting, pulling, pushing more than 25lb     If you have any questions or concerns, please don't hesitate to call.          Sincerely,          Theodore Bravo MD        CC: No Recipients

## 2023-11-28 ENCOUNTER — TELEPHONE (OUTPATIENT)
Dept: PHYSICAL THERAPY | Facility: OTHER | Age: 48
End: 2023-11-28

## 2023-11-28 NOTE — TELEPHONE ENCOUNTER
Duplicate referral  PCP also entered a direct PT referral   Pt can call PT and schedule PT eval when ready, as that is where comp spine would have sent him

## 2023-12-04 ENCOUNTER — TELEPHONE (OUTPATIENT)
Age: 48
End: 2023-12-04

## 2023-12-04 NOTE — TELEPHONE ENCOUNTER
Patient is calling in regards to his back pain and his referrals. He does have an appt with PT next Monday. The spine and pain referral told him this is just a virtual visit and they would "just refer him to PT". He is not happy about that and wants to confirm this with you. He wants to know how does he get an appt with an orthopedic doctor. He said his sxs are not getting better, if fact he feels worse.

## 2023-12-05 DIAGNOSIS — M54.16 RADICULOPATHY, LUMBAR REGION: ICD-10-CM

## 2023-12-05 DIAGNOSIS — M51.16 INTERVERTEBRAL DISC DISORDER WITH RADICULOPATHY OF LUMBAR REGION: Primary | ICD-10-CM

## 2023-12-05 DIAGNOSIS — M51.26 LUMBAR DISC HERNIATION: ICD-10-CM

## 2023-12-05 DIAGNOSIS — G89.29 CHRONIC LEFT-SIDED LOW BACK PAIN WITH LEFT-SIDED SCIATICA: ICD-10-CM

## 2023-12-05 DIAGNOSIS — M54.42 CHRONIC LEFT-SIDED LOW BACK PAIN WITH LEFT-SIDED SCIATICA: ICD-10-CM

## 2023-12-05 RX ORDER — MELOXICAM 15 MG/1
15 TABLET ORAL
Qty: 30 TABLET | Refills: 0 | Status: SHIPPED | OUTPATIENT
Start: 2023-12-05 | End: 2024-01-04

## 2023-12-11 ENCOUNTER — EVALUATION (OUTPATIENT)
Dept: PHYSICAL THERAPY | Facility: MEDICAL CENTER | Age: 48
End: 2023-12-11
Payer: COMMERCIAL

## 2023-12-11 DIAGNOSIS — M51.16 INTERVERTEBRAL DISC DISORDER WITH RADICULOPATHY OF LUMBAR REGION: ICD-10-CM

## 2023-12-11 DIAGNOSIS — M51.26 LUMBAR DISC HERNIATION: ICD-10-CM

## 2023-12-11 DIAGNOSIS — M54.16 RADICULOPATHY, LUMBAR REGION: ICD-10-CM

## 2023-12-11 DIAGNOSIS — G89.29 CHRONIC LEFT-SIDED LOW BACK PAIN WITH LEFT-SIDED SCIATICA: ICD-10-CM

## 2023-12-11 DIAGNOSIS — M54.42 CHRONIC LEFT-SIDED LOW BACK PAIN WITH LEFT-SIDED SCIATICA: ICD-10-CM

## 2023-12-11 PROCEDURE — 97112 NEUROMUSCULAR REEDUCATION: CPT | Performed by: PHYSICAL THERAPIST

## 2023-12-11 PROCEDURE — 97161 PT EVAL LOW COMPLEX 20 MIN: CPT | Performed by: PHYSICAL THERAPIST

## 2023-12-11 NOTE — PROGRESS NOTES
PT Evaluation     Today's date: 2023  Patient name: Alvie Kocher  : 1975  MRN: [id number]  Referring provider: Ivania Dumont MD  Dx:   Encounter Diagnosis     ICD-10-CM    1. Chronic left-sided low back pain with left-sided sciatica  M54.42 Ambulatory Referral to Physical Therapy    G89.29       2. Intervertebral disc disorder with radiculopathy of lumbar region  M51.16 Ambulatory Referral to Physical Therapy      3. Radiculopathy, lumbar region  M54.16 Ambulatory Referral to Physical Therapy      4. Lumbar disc herniation  M51.26 Ambulatory Referral to Physical Therapy          Start Time: 1445  Stop Time: 1540  Total time in clinic (min): 55 minutes    Assessment  Assessment details: Pt is a 57 y.o. male who presents to OP PT with reports of increased R sided low back pain with radicular symptoms into the R LE, decreased lumbar ROM, decreased LE strength and decreased activity tolerance. These impairments limit the patient from participating in hobbies of wild life photography, decreased tolerance to sleeping comfortably at night and decreased tolerance to performing home care duties. Movement examination was completed with findings noted increased pain and peritonealization of symptoms with WB flex and extension ROM. [address] attempted in lying which also peripheralized symptoms. Prone laying was attempted however pt was unable to achieve position. Pillows under stomach was attempted however unable to comfortably assume position. Pt does present with signs and symptoms of lateral shift with observed shoulder to the L of his hips and inability to correct and maintain shift correction. Pt was educated about this but was hesitant to complete correction maneuver. Pt brought in previous HEP from prior PT low back pain episode consisting of TA bracing exercises, core stability exercises and lumbar ROM exercises.   Pt was educated about the purposes of these exercises and guidance was provided on which exercises would be appropriate at this time. Pt was educated about the benefits of formal PT, establishing movement based exercises based on directional preferences and core stability training. I believe this patient is a good candidate for and will benefit from skilled physical therapy for manual therapy to the lumbar spine, ROM exercises, strengthening exercises and mechanics training to improve limitations and assist the patient to return to PLOF. Pt is electing to proceed independently with rest and HEP as tolerated. Pt was educated that his chart will remain open for 2 additional weeks pending need/desire for formal PT follow up sessions. Pt was educated to contact PT with any questions or concerns in the meantime. Thank you for the opportunity to participate in [address] [place] [address]. Positive Prognostic Indicators: desire to improve    Negative Prognostic Indicators: chronicity of symptoms       Impairments: abnormal gait, abnormal muscle firing, abnormal muscle tone, abnormal or restricted ROM, abnormal movement, activity intolerance, impaired physical strength, lacks appropriate home exercise program, pain with function, poor posture  and poor body mechanics    Symptom irritability: highUnderstanding of Dx/Px/POC: good   Prognosis: good    Goals  STGs: 4 weeks  1) Pt will have SPR decrease of 2 units at worst  2) pt will have improved lumbar extension AROM to minimally limited  3) pt will have improved foto score of 10 points    LTGs: 8 weeks  1) pt will be independent with HEP by D/C  2) pt will be independent with symptom management by D/C  3) pt will subjectively report return to baseline symptoms in the L/S with all functional activity in order to demonstrate improved activity tolerance by DC. Plan  Plan details: Above was frequency recommended to patient. Pt is electing to proceed independently with rest and HEP as tolerated.   Patient would benefit from: skilled physical therapy  Planned modality interventions: cryotherapy and thermotherapy: hydrocollator packs  Planned therapy interventions: IASTM, joint mobilization, manual therapy, neuromuscular re-education, patient education, postural training, strengthening, stretching, therapeutic activities, therapeutic exercise, home exercise program, gait training, functional ROM exercises and flexibility  Frequency: 1x week  Duration in weeks: 12  Plan of Care beginning date: 12/11/2023  Plan of Care expiration date: 3/4/2024  Treatment plan discussed with: patient        Subjective Evaluation    History of Present Illness  Mechanism of injury: DOO:  TAYLOR:      Subjective Comments: pt reports that he was here previously due to back pain on the other side. He reports that he has lumbago, reports he was squatting down and felt a pop. He reports that this occurred about 2-3 weeks ago. He reports that he has increased pain standing, for about a minute. Pt reports that his pain begins in his R buttock and wraps around to his groin. He reports pain in the R knee and 2 places in the shin. He notes sitting is better than standing. He reports that he has drop foot in his LLE and feels he is getting it in his R foot now. Reports he is better in the morning and worse at night. Pain   Rest: 0/10   Best: 0/10   Worst: 11/10 when it first happened. 8/10 in the past week. Relieving Factors: muscle relaxer, tylenol, meloxicam. Attempted prednisone without relief. Sitting, or laying    Exacerbating Factors: standing    Sleeping: now fine; side sleeper    Home Set-up: able to complete stairs, but with difficulty; has had increased difficulty getting out of chair but this is nothing new.     ADLs: increased difficulty reaching feet, has a sock tool with assistance, but has not needed to use it so far    Work/Hobbies: unable, wild life photography       Previous Treatment: previous PT, tylenol (has tried injection but did not like it)    Goals:  confirm drop foot, and to see if his old exercises are still good to do. Objective     Neurological Testing     Sensation     Lumbar   Left   Intact: light touch    Right   Intact: light touch    Reflexes   Left   Patellar (L4): normal (2+)  Achilles (S1): normal (2+)    Right   Patellar (L4): normal (2+)  Achilles (S1): normal (2+)    Active Range of Motion     Lumbar   Flexion:  with pain Restriction level: maximal  Extension:  with pain Restriction level: maximal  Left lateral flexion:  Restriction level: moderate  Right lateral flexion:  Restriction level: moderate    Passive Range of Motion   Left Hip   Flexion: 120 degrees   Abduction: 45 degrees   External rotation (90/90): 45 degrees with pain  Internal rotation (90/90): 10 degrees     Additional Passive Range of Motion Details  L ER pain on R LE    Strength/Myotome Testing     Left Hip   Planes of Motion   Flexion: 5  Abduction: 4+  Adduction: 4+    Right Hip   Planes of Motion   Flexion: 4+  Abduction: 4+  Adduction: 4+    Left Knee   Flexion: 5  Extension: 5    Right Knee   Flexion: 4+  Extension: 4+    Left Ankle/Foot   Dorsiflexion: 4+  Plantar flexion: 5  Great toe extension: 4    Right Ankle/Foot   Dorsiflexion: 5  Plantar flexion: 5  Great toe extension: 4+    Tests     Right Hip   SLR: Positive. Additional Tests Details  Positive crossed SLR             Eval/Re-Eval POC Expires Auth #/ Referral # Total Visits Start Date Expiration Date Extension Info Visits Limitation   12/11 3/4/24  [id number]  10    12/7 12/31                                                    1 2 3 4 5 6   12/11        7 8 9 10 11 12           13 14 15 16 17 18           19 20 21 22 23 24           25 26 27 28 29 30               Precautions: long history of LBP      Manuals 12/11            STM L/S                                                    Neuro Re-Ed             PPT 5" x10            TAB+ marching             TAB+ hip abd Ed. TAB+ hip add x10                                                   Ther Ex             bike             Prone lying             PPU                                                                              Ther Activity                                       Gait Training                                       Modalities

## 2023-12-22 ENCOUNTER — OFFICE VISIT (OUTPATIENT)
Dept: FAMILY MEDICINE CLINIC | Facility: CLINIC | Age: 48
End: 2023-12-22
Payer: COMMERCIAL

## 2023-12-22 VITALS
OXYGEN SATURATION: 98 % | WEIGHT: 241 LBS | HEART RATE: 96 BPM | TEMPERATURE: 98 F | HEIGHT: 70 IN | BODY MASS INDEX: 34.5 KG/M2 | SYSTOLIC BLOOD PRESSURE: 130 MMHG | DIASTOLIC BLOOD PRESSURE: 84 MMHG | RESPIRATION RATE: 16 BRPM

## 2023-12-22 DIAGNOSIS — F41.9 ANXIETY: ICD-10-CM

## 2023-12-22 DIAGNOSIS — M51.16 INTERVERTEBRAL DISC DISORDER WITH RADICULOPATHY OF LUMBAR REGION: Primary | ICD-10-CM

## 2023-12-22 DIAGNOSIS — G89.29 CHRONIC LEFT-SIDED LOW BACK PAIN WITH LEFT-SIDED SCIATICA: ICD-10-CM

## 2023-12-22 DIAGNOSIS — M21.379 DROP FOOT GAIT: ICD-10-CM

## 2023-12-22 DIAGNOSIS — M51.26 LUMBAR DISC HERNIATION: ICD-10-CM

## 2023-12-22 DIAGNOSIS — M54.42 CHRONIC LEFT-SIDED LOW BACK PAIN WITH LEFT-SIDED SCIATICA: ICD-10-CM

## 2023-12-22 PROCEDURE — 99214 OFFICE O/P EST MOD 30 MIN: CPT | Performed by: FAMILY MEDICINE

## 2023-12-22 NOTE — PROGRESS NOTES
Name: hSashi Roy      : 1975      MRN: [id number]  Encounter Provider: Bryan Rivera MD  Encounter Date: 2023   Encounter department: [facility]    Assessment & Plan     1. Intervertebral disc disorder with radiculopathy of lumbar region    2. Chronic left-sided low back pain with left-sided sciatica    3. Anxiety    4. Lumbar disc herniation    5. Drop foot gait      Over all there is some improvement in the lumbar back pain  Recommend continued exercise for foot drop  Monitor for worsening symptoms  Maybe consider pain medicine eval  Follow up in 1-3 month for annual physical          Subjective     HPI    48-year-old male patient presents for follow-up regarding his most recent visit.  Patient reports some improvement in his lower back symptoms, specifically he is able to sleep on his right side now.  Patient is being seen by physical therapy and was confirmed to have dropfoot on his other leg as well.   Patient was informed that his previous exercises for dropfoot is still applicable and plans to continue this.    He has been compliant with medication, Cymbalta, interested in continue this medication at this time for both chronic pain and depressive symptoms.  Patient reports he has not had excessive uncontrollable negative thoughts but it still happens occasionally.      PHQ-2/9 Depression Screening    Little interest or pleasure in doing things: 3 - nearly every day  Feeling down, depressed, or hopeless: 3 - nearly every day  Trouble falling or staying asleep, or sleeping too much: 1 - several days  Feeling tired or having little energy: 1 - several days  Poor appetite or overeating: 3 - nearly every day  Feeling bad about yourself - or that you are a failure or have let yourself or your family down: 3 - nearly every day  Trouble concentrating on things, such as reading the newspaper or watching television: 3 - nearly every day  Moving or speaking so slowly that other  people could have noticed. Or the opposite - being so fidgety or restless that you have been moving around a lot more than usual: 3 - nearly every day  Thoughts that you would be better off dead, or of hurting yourself in some way: 0 - not at all  PHQ-9 Score: 20   PHQ-9 Interpretation: Severe depression              Review of Systems   Constitutional:  Negative for chills and fever.   HENT:  Negative for congestion, rhinorrhea and sore throat.    Respiratory:  Negative for shortness of breath.    Cardiovascular:  Negative for chest pain.   Gastrointestinal:  Negative for abdominal pain.   Musculoskeletal:  Positive for arthralgias, back pain and gait problem.   Neurological:  Positive for weakness and numbness.        Foot drop   Psychiatric/Behavioral:  Positive for sleep disturbance (improving).        Past Medical History:   Diagnosis Date    Anxiety     Back pain     Chronic pain disorder     back    Depression     Kidney stone     Sciatica     Wears glasses      Past Surgical History:   Procedure Laterality Date    FL RETROGRADE PYELOGRAM  11/4/2021    KIDNEY STONE SURGERY      [place] CYSTO/URETERO W/LITHOTRIPSY &INDWELL STENT INSRT Left 11/4/2021    Procedure: CYSTOSCOPY URETEROSCOPY WITH LITHOTRIPSY HOLMIUM LASER, basket stone extraction, RETROGRADE PYELOGRAM AND INSERTION STENT URETERAL;  Surgeon: Nicola Ferrell MD;  Location: [facility] OR;  Service: Urology     Family History   Problem Relation Age of Onset    Psychiatric Illness Mother     No Known Problems Father      Social History     Socioeconomic History    Marital status: Single     Spouse name: None    Number of children: None    Years of education: None    Highest education level: None   Occupational History    None   Tobacco Use    Smoking status: Former     Current packs/day: 0.00     Average packs/day: 2.0 packs/day for 20.6 years (41.2 ttl pk-yrs)     Types: Cigarettes     Start date: 1991     Quit date: 8/11/2011     Years since quitting:  12.3    Smokeless tobacco: Never    Tobacco comments:     quit smoking x11yrs ago   Vaping Use    Vaping status: Every Day    Start date: 8/11/2011    Substances: Nicotine, THC, CBD, Flavoring   Substance and Sexual Activity    Alcohol use: No    Drug use: Yes     Types: Marijuana     Comment: daily- vape- yesterday     Sexual activity: None   Other Topics Concern    None   Social History Narrative    None     Social Determinants of Health     Financial Resource Strain: Not on file   Food Insecurity: Not on file   Transportation Needs: Not on file   Physical Activity: Not on file   Stress: Not on file   Social Connections: Not on file   Intimate Partner Violence: Not on file   Housing Stability: Not on file     Current Outpatient Medications on File Prior to Visit   Medication Sig    Acetaminophen (TYLENOL PO) Take by mouth      cyclobenzaprine (FLEXERIL) 10 mg tablet Take 1 tablet (10 mg total) by mouth 3 (three) times a day as needed for muscle spasms    DULoxetine (CYMBALTA) 30 mg delayed release capsule Take 1 capsule (30 mg total) by mouth daily    hydrOXYzine HCL (ATARAX) 25 mg tablet Take 1 tablet (25 mg total) by mouth every 6 (six) hours as needed for itching    LORazepam (ATIVAN) 0.5 mg tablet Take 1 tablet (0.5 mg total) by mouth 2 (two) times a day as needed for anxiety    meloxicam (Mobic) 15 mg tablet Take 1 tablet (15 mg total) by mouth daily with dinner    mometasone (ELOCON) 0.1 % ointment Apply topically daily    [DISCONTINUED] acetaminophen (TYLENOL) 325 mg tablet Take 2 tablets (650 mg total) by mouth every 4 (four) hours as needed for mild pain (Patient not taking: Reported on 8/22/2023)    [DISCONTINUED] docusate sodium (COLACE) 100 mg capsule Take 1 capsule (100 mg total) by mouth 2 (two) times a day for 15 days (Patient not taking: Reported on 11/27/2023)    [DISCONTINUED] ibuprofen (MOTRIN) 200 mg tablet Take 1 tablet (200 mg total) by mouth every 6 (six) hours for 2 days (Patient not  "taking: Reported on 12/22/2023)    [DISCONTINUED] oxybutynin (DITROPAN) 5 mg tablet Take 1 tablet (5 mg total) by mouth every 6 (six) hours as needed (bladder spasms) (Patient not taking: Reported on 12/21/2021)    [DISCONTINUED] tamsulosin (FLOMAX) 0.4 mg Take 1 capsule (0.4 mg total) by mouth daily with dinner (Patient not taking: Reported on 12/21/2021)     No Known Allergies  Immunization History   Administered Date(s) Administered    COVID-19 PFIZER VACCINE 0.3 ML IM 04/13/2021, 05/04/2021, 12/21/2021    Influenza, injectable, quadrivalent, preservative free 0.5 mL 12/23/2020       Objective     /84 (BP Location: Left arm, Patient Position: Sitting, Cuff Size: Large)   Pulse 96   Temp 98 °F (36.7 °C) (Temporal)   Resp 16   Ht 5' 10\" (1.778 m)   Wt 109 kg (241 lb)   SpO2 98%   BMI 34.58 kg/m²     Physical Exam  Vitals reviewed.   Constitutional:       General: He is not in acute distress.     Appearance: Normal appearance. He is not ill-appearing, toxic-appearing or diaphoretic.   Cardiovascular:      Rate and Rhythm: Normal rate and regular rhythm.      Pulses: Normal pulses.      Heart sounds: Normal heart sounds. No murmur heard.  Pulmonary:      Effort: Pulmonary effort is normal.   Musculoskeletal:      Comments: Mild tenderness over lower lumbar spine   Skin:     General: Skin is warm and dry.      Capillary Refill: Capillary refill takes less than 2 seconds.      Coloration: Skin is not jaundiced.   Neurological:      General: No focal deficit present.      Mental Status: He is alert and oriented to person, place, and time.   Psychiatric:         Mood and Affect: Mood normal.             Bryan Rivera MD    "

## 2024-01-03 DIAGNOSIS — G89.29 CHRONIC LEFT-SIDED LOW BACK PAIN WITH LEFT-SIDED SCIATICA: ICD-10-CM

## 2024-01-03 DIAGNOSIS — M54.16 RADICULOPATHY, LUMBAR REGION: ICD-10-CM

## 2024-01-03 DIAGNOSIS — M51.26 LUMBAR DISC HERNIATION: ICD-10-CM

## 2024-01-03 DIAGNOSIS — M54.42 CHRONIC LEFT-SIDED LOW BACK PAIN WITH LEFT-SIDED SCIATICA: ICD-10-CM

## 2024-01-03 RX ORDER — MELOXICAM 15 MG/1
15 TABLET ORAL
Qty: 30 TABLET | Refills: 0 | Status: SHIPPED | OUTPATIENT
Start: 2024-01-03

## 2024-11-12 ENCOUNTER — TELEPHONE (OUTPATIENT)
Age: 49
End: 2024-11-12

## 2024-11-12 ENCOUNTER — OFFICE VISIT (OUTPATIENT)
Dept: FAMILY MEDICINE CLINIC | Facility: CLINIC | Age: 49
End: 2024-11-12
Payer: COMMERCIAL

## 2024-11-12 VITALS
OXYGEN SATURATION: 96 % | SYSTOLIC BLOOD PRESSURE: 144 MMHG | WEIGHT: 226.4 LBS | DIASTOLIC BLOOD PRESSURE: 90 MMHG | BODY MASS INDEX: 32.41 KG/M2 | HEART RATE: 95 BPM | TEMPERATURE: 97.9 F | HEIGHT: 70 IN

## 2024-11-12 DIAGNOSIS — N20.0 KIDNEY STONES: ICD-10-CM

## 2024-11-12 DIAGNOSIS — F41.9 ANXIETY: ICD-10-CM

## 2024-11-12 DIAGNOSIS — F33.9 DEPRESSION, RECURRENT (HCC): ICD-10-CM

## 2024-11-12 DIAGNOSIS — N20.0 RENAL CALCULUS, LEFT: Primary | ICD-10-CM

## 2024-11-12 LAB
SL AMB  POCT GLUCOSE, UA: NORMAL
SL AMB LEUKOCYTE ESTERASE,UA: NORMAL
SL AMB POCT BILIRUBIN,UA: NORMAL
SL AMB POCT BLOOD,UA: 200
SL AMB POCT CLARITY,UA: CLEAR
SL AMB POCT COLOR,UA: YELLOW
SL AMB POCT KETONES,UA: NORMAL
SL AMB POCT NITRITE,UA: NORMAL
SL AMB POCT PH,UA: 6
SL AMB POCT SPECIFIC GRAVITY,UA: 1.02
SL AMB POCT URINE PROTEIN: NORMAL
SL AMB POCT UROBILINOGEN: 0.2

## 2024-11-12 PROCEDURE — 99213 OFFICE O/P EST LOW 20 MIN: CPT | Performed by: INTERNAL MEDICINE

## 2024-11-12 PROCEDURE — 81003 URINALYSIS AUTO W/O SCOPE: CPT | Performed by: INTERNAL MEDICINE

## 2024-11-12 PROCEDURE — 87086 URINE CULTURE/COLONY COUNT: CPT | Performed by: INTERNAL MEDICINE

## 2024-11-12 RX ORDER — LORAZEPAM 0.5 MG/1
0.5 TABLET ORAL 2 TIMES DAILY
Qty: 30 TABLET | Refills: 0 | Status: SHIPPED | OUTPATIENT
Start: 2024-11-12

## 2024-11-12 NOTE — PROGRESS NOTES
Depression Screening Follow-up Plan: Patient's depression screening was positive with a PHQ-2 score of . Their PHQ-9 score was 18. {Depression screen follow-up plan:[id number]}

## 2024-11-12 NOTE — TELEPHONE ENCOUNTER
New Patient    What is the reason for the patient’s appointment? NP- referred by PCP for kidney stones. Scheduling CT renal protocol to have done prior to appointment with us.    What office location does the patient prefer? [place]    Does patient have Imaging/Lab Results:  In Epic     Have patient records been requested?  If No, are the records showing in Epic:   In Epic    INSURANCE:   Do we accept the patient's insurance or is the patient Self-Pay?  BC    HISTORY:   Has the patient had any previous Urologist(s)? No     Was the patient seen in the ED? No    Has the patient had any outside testing done? No     Does the patient have a personal history of cancer? unknown

## 2024-11-12 NOTE — ASSESSMENT & PLAN NOTE
Depression Screening Follow-up Plan: Patient's depression screening was positive with a PHQ-9 score of 18. Patient assessed for underlying major depression. They have no active suicidal ideations. Brief counseling provided and recommend additional follow-up/re-evaluation next office visit. Patient declines further evaluation by mental health professional and/or medications. They have no active suicidal ideations. Brief counseling provided and recommend additional follow-up/re-evaluation at next office visit.

## 2024-11-12 NOTE — PROGRESS NOTES
Ambulatory Visit  Name: Shashi Roy      : 1975      MRN: [id number]  Encounter Provider: Maribell Gambino MD  Encounter Date: 2024   Encounter department: [facility]    Assessment & Plan  Renal calculus, left  Patient has had renal calculi in the past and even had to have surgery for it about 2 years ago he apparently has the same symptoms again.  Urine is only positive for blood.  Will refer to urology and get a CT stone study         Kidney stones    Orders:    POCT urine dip auto non-scope    Urine culture    Ambulatory Referral to Urology; Future    CT renal stone study abdomen pelvis wo contrast; Future    Basic metabolic panel; Future    Depression, recurrent (HCC)  Depression Screening Follow-up Plan: Patient's depression screening was positive with a PHQ-9 score of 18. {Depression screen follow-up plan:[id number]}         Anxiety            History of Present Illness   {?Quick Links Encounters * My Last Note * Last Note in Specialty * Snapshot * Since Last Visit * History :88467}  Patient has had kidney stones in the past and that she actually had to go in surgically to remove it.  He really has not changed any of his lifestyle issues and appears to have another stone    Flank Pain  This is a recurrent problem. The current episode started more than 1 year ago. The problem occurs constantly. The problem has been gradually worsening since onset. The pain is present in the lumbar spine. The quality of the pain is described as stabbing. The pain does not radiate. The pain is at a severity of 5/10. The pain is moderate. The pain is The same all the time. Exacerbated by: No aggravating factors. Pertinent negatives include no abdominal pain, chest pain, dysuria, fever, leg pain, weakness or weight loss. Risk factors: History of renal calculi. He has tried nothing for the symptoms. The treatment provided no relief.       {History obtained from (Optional):40840}  Review of Systems  "  Constitutional:  Negative for chills, fever and weight loss.   HENT:  Negative for ear pain and sore throat.    Eyes:  Negative for pain and visual disturbance.   Respiratory:  Negative for cough and shortness of breath.    Cardiovascular:  Negative for chest pain and palpitations.   Gastrointestinal:  Negative for abdominal pain and vomiting.   Genitourinary:  Positive for flank pain. Negative for dysuria, frequency and hematuria.   Musculoskeletal:  Negative for arthralgias and back pain.   Skin:  Negative for color change and rash.   Neurological:  Negative for seizures, syncope and weakness.   All other systems reviewed and are negative.    {Select to Display [facility] (Optional):11658}      Objective   {?Quick Links Trend Vitals * Enter New Vitals * Results Review * Timeline (Adult) * Labs * Imaging * Cardiology * Procedures * Lung Cancer Screening * Surgical eConsent :64121}  /90 (BP Location: Left arm, Patient Position: Sitting, Cuff Size: Large)   Pulse 95   Temp 97.9 °F (36.6 °C) (Temporal)   Ht 5' 10\" (1.778 m)   Wt 103 kg (226 lb 6.4 oz)   SpO2 96%   BMI 32.49 kg/m²     Physical Exam  Vitals and nursing note reviewed.   Constitutional:       General: He is not in acute distress.     Appearance: He is well-developed.   HENT:      Head: Normocephalic and atraumatic.   Eyes:      Conjunctiva/sclera: Conjunctivae normal.   Neck:      Vascular: No carotid bruit.   Cardiovascular:      Rate and Rhythm: Normal rate and regular rhythm.      Heart sounds: No murmur heard.  Pulmonary:      Effort: Pulmonary effort is normal. No respiratory distress.      Breath sounds: Normal breath sounds. No rales.   Chest:      Chest wall: Tenderness present.   Abdominal:      General: Bowel sounds are normal. There is no distension.      Palpations: Abdomen is soft. There is no mass.      Tenderness: There is no abdominal tenderness. There is left CVA tenderness.   Musculoskeletal:         General: No swelling.      " Cervical back: Neck supple. No rigidity.      Right lower leg: No edema.      Left lower leg: No edema.   Skin:     General: Skin is warm and dry.      Capillary Refill: Capillary refill takes less than 2 seconds.      Findings: No rash.   Neurological:      General: No focal deficit present.      Mental Status: He is alert and oriented to person, place, and time.   Psychiatric:         Mood and Affect: Mood normal.         Behavior: Behavior normal.         Thought Content: Thought content normal.         Judgment: Judgment normal.       {Administrative / Billing Section (Optional):85887}

## 2024-11-12 NOTE — PROGRESS NOTES
Ambulatory Visit  Name: Shashi Roy      : 1975      MRN: [id number]  Encounter Provider: Maribell Gambino MD  Encounter Date: 2024   Encounter department: [facility]    Assessment & Plan  Renal calculus, left  Patient has had in the past that they even had to do surgery for.  Was about 2 years ago.  He again thinks he has another 1 because it feels identical and even on the same side       Kidney stones    Orders:    POCT urine dip auto non-scope    Urine culture    Ambulatory Referral to Urology; Future    CT renal stone study abdomen pelvis wo contrast; Future    Basic metabolic panel; Future    Depression, recurrent (HCC)  Depression Screening Follow-up Plan: Patient's depression screening was positive with a PHQ-9 score of 18. Patient assessed for underlying major depression. They have no active suicidal ideations. Brief counseling provided and recommend additional follow-up/re-evaluation next office visit. Patient declines further evaluation by mental health professional and/or medications. They have no active suicidal ideations. Brief counseling provided and recommend additional follow-up/re-evaluation at next office visit.         Anxiety  Patient wants Ativan even though I think really a better choice would be an antidepressant  Orders:    LORazepam (Ativan) 0.5 mg tablet; Take 1 tablet (0.5 mg total) by mouth 2 (two) times a day       History of Present Illness     Patient has had kidney stones in the past and that she actually had to go in surgically to remove it.  He really has not changed any of his lifestyle issues and appears to have another stone    Flank Pain  This is a recurrent problem. The current episode started more than 1 year ago. The problem occurs constantly. The problem has been gradually worsening since onset. The pain is present in the lumbar spine. The quality of the pain is described as stabbing. The pain does not radiate. The pain is at a severity of 5/10.  "The pain is moderate. The pain is The same all the time. Exacerbated by: No aggravating factors. Pertinent negatives include no abdominal pain, chest pain, dysuria, fever, leg pain, weakness or weight loss. Risk factors: History of renal calculi. He has tried nothing for the symptoms. The treatment provided no relief.         Review of Systems   Constitutional:  Negative for chills, fever and weight loss.   HENT:  Negative for ear pain and sore throat.    Eyes:  Negative for pain and visual disturbance.   Respiratory:  Negative for cough and shortness of breath.    Cardiovascular:  Negative for chest pain and palpitations.   Gastrointestinal:  Negative for abdominal pain and vomiting.   Genitourinary:  Positive for flank pain. Negative for dysuria, frequency and hematuria.   Musculoskeletal:  Negative for arthralgias and back pain.   Skin:  Negative for color change and rash.   Neurological:  Negative for seizures, syncope and weakness.   All other systems reviewed and are negative.          Objective     /90 (BP Location: Left arm, Patient Position: Sitting, Cuff Size: Large)   Pulse 95   Temp 97.9 °F (36.6 °C) (Temporal)   Ht 5' 10\" (1.778 m)   Wt 103 kg (226 lb 6.4 oz)   SpO2 96%   BMI 32.49 kg/m²     Physical Exam  Vitals and nursing note reviewed.   Constitutional:       General: He is not in acute distress.     Appearance: He is well-developed.   HENT:      Head: Normocephalic and atraumatic.   Eyes:      Conjunctiva/sclera: Conjunctivae normal.   Neck:      Vascular: No carotid bruit.   Cardiovascular:      Rate and Rhythm: Normal rate and regular rhythm.      Heart sounds: No murmur heard.  Pulmonary:      Effort: Pulmonary effort is normal. No respiratory distress.      Breath sounds: Normal breath sounds. No rales.   Chest:      Chest wall: Tenderness present.   Abdominal:      General: Bowel sounds are normal. There is no distension.      Palpations: Abdomen is soft. There is no mass.      " Tenderness: There is no abdominal tenderness. There is left CVA tenderness.   Musculoskeletal:         General: No swelling.      Cervical back: Neck supple. No rigidity.      Right lower leg: No edema.      Left lower leg: No edema.   Skin:     General: Skin is warm and dry.      Capillary Refill: Capillary refill takes less than 2 seconds.      Findings: No rash.   Neurological:      General: No focal deficit present.      Mental Status: He is alert and oriented to person, place, and time.   Psychiatric:         Mood and Affect: Mood normal.         Behavior: Behavior normal.         Thought Content: Thought content normal.         Judgment: Judgment normal.

## 2024-11-12 NOTE — ASSESSMENT & PLAN NOTE
Patient has had renal calculi in the past and even had to have surgery for it about 2 years ago he apparently has the same symptoms again.  Urine is only positive for blood.  Will refer to urology and get a CT stone study

## 2024-11-12 NOTE — ASSESSMENT & PLAN NOTE
Patient wants Ativan even though I think really a better choice would be an antidepressant  Orders:    LORazepam (Ativan) 0.5 mg tablet; Take 1 tablet (0.5 mg total) by mouth 2 (two) times a day

## 2024-11-12 NOTE — ASSESSMENT & PLAN NOTE
Patient has had in the past that they even had to do surgery for.  Was about 2 years ago.  He again thinks he has another 1 because it feels identical and even on the same side

## 2024-11-14 LAB — BACTERIA UR CULT: NORMAL

## 2024-11-18 ENCOUNTER — TELEPHONE (OUTPATIENT)
Age: 49
End: 2024-11-18

## 2024-11-19 NOTE — TELEPHONE ENCOUNTER
Patient calling for update status on the message.patient was told that Dr. Gambino was having computer issues yesturday and that she will get back to him when she gets a chance. Patient was also told to call back if he has not heard anything from office by 2pm.

## 2024-11-20 DIAGNOSIS — N20.0 RENAL CALCULI: Primary | ICD-10-CM

## 2024-11-20 RX ORDER — OXYCODONE AND ACETAMINOPHEN 5; 325 MG/1; MG/1
1 TABLET ORAL EVERY 4 HOURS PRN
Qty: 30 TABLET | Refills: 0 | Status: SHIPPED | OUTPATIENT
Start: 2024-11-20

## 2024-11-25 ENCOUNTER — TELEPHONE (OUTPATIENT)
Dept: FAMILY MEDICINE CLINIC | Facility: CLINIC | Age: 49
End: 2024-11-25

## 2024-11-25 NOTE — TELEPHONE ENCOUNTER
Patient called in to check in about an insurance authorization for his CT renal stone study- He had it scheduled but it was canceled due to not having auth on file. Please advise so we can keep patient up to date.

## 2024-11-26 NOTE — TELEPHONE ENCOUNTER
Patient calling for update status on the CT PA.      I spoke with ed and she stated that this was denied and a message was send to Dr. Rivera to see what he wants to do moving forward. Patient was informed and will await for Dr. Rivera to decide on further steps.  Viola Kline

## 2024-11-29 DIAGNOSIS — M54.40 CHRONIC MIDLINE LOW BACK PAIN WITH SCIATICA, SCIATICA LATERALITY UNSPECIFIED: Primary | ICD-10-CM

## 2024-11-29 DIAGNOSIS — N20.0 RENAL CALCULUS, LEFT: ICD-10-CM

## 2024-11-29 DIAGNOSIS — G89.29 CHRONIC MIDLINE LOW BACK PAIN WITH SCIATICA, SCIATICA LATERALITY UNSPECIFIED: Primary | ICD-10-CM

## 2024-12-02 ENCOUNTER — TELEPHONE (OUTPATIENT)
Dept: FAMILY MEDICINE CLINIC | Facility: CLINIC | Age: 49
End: 2024-12-02

## 2024-12-02 NOTE — TELEPHONE ENCOUNTER
Spoke to pt.  He has to re-establish with Urol and has appt sched for January.  I discussed the referral for Pain Mgmt as well.  He's opting not to do Pain Mgmt for his back at this time.    I asked him to contact us if he needs us further regarding the kidney stones.

## 2025-01-06 ENCOUNTER — OFFICE VISIT (OUTPATIENT)
Dept: UROLOGY | Facility: CLINIC | Age: 50
End: 2025-01-06
Payer: COMMERCIAL

## 2025-01-06 VITALS
WEIGHT: 239 LBS | HEART RATE: 75 BPM | DIASTOLIC BLOOD PRESSURE: 80 MMHG | OXYGEN SATURATION: 96 % | SYSTOLIC BLOOD PRESSURE: 118 MMHG | BODY MASS INDEX: 34.22 KG/M2 | HEIGHT: 70 IN

## 2025-01-06 DIAGNOSIS — N20.0 RENAL CALCULUS, LEFT: ICD-10-CM

## 2025-01-06 DIAGNOSIS — R10.9 LEFT FLANK PAIN: Primary | ICD-10-CM

## 2025-01-06 LAB
SL AMB  POCT GLUCOSE, UA: ABNORMAL
SL AMB LEUKOCYTE ESTERASE,UA: ABNORMAL
SL AMB POCT BILIRUBIN,UA: ABNORMAL
SL AMB POCT BLOOD,UA: ABNORMAL
SL AMB POCT CLARITY,UA: CLEAR
SL AMB POCT COLOR,UA: YELLOW
SL AMB POCT KETONES,UA: ABNORMAL
SL AMB POCT NITRITE,UA: ABNORMAL
SL AMB POCT PH,UA: 6
SL AMB POCT SPECIFIC GRAVITY,UA: 1.01
SL AMB POCT URINE PROTEIN: ABNORMAL
SL AMB POCT UROBILINOGEN: 0.2

## 2025-01-06 PROCEDURE — 99203 OFFICE O/P NEW LOW 30 MIN: CPT

## 2025-01-06 PROCEDURE — 81002 URINALYSIS NONAUTO W/O SCOPE: CPT

## 2025-01-06 NOTE — PROGRESS NOTES
"1/6/2025      No chief complaint on file.        Assessment and Plan    49 y.o. male     Nephrolithiasis  -previously known to our service in 2021. Underwent left ureteroscopy with Dr. Ferrell (11/04/21).  -No recent  imaging since 2021.  -Ongoing left flank pain for about 2 months-pain worse in November. Describes as pinching sensation.  -He denies dysuria, frequency, urgency, and gross hematuria. Denies fevers and chills. Denies nausea and vomiting.  -Renal US and KUB ordered to be completed now. We will notify patient of results.  -ED precautions for uncontrollable pain, fevers, vomiting.  -All questions addressed.       History of Present Illness  Shashi Roy is a 49 y.o. male here for evaluation of flank pain. He has a pmhx of nephrolithiasis. He had a left ureteroscopy in 2021 with Dr. Ferrell and previously treated by Dr. Brady in the past. He last had  imaging in 2021. CT renal stone study showed tiny stone fragments in left ureter, all measuring 1 mm or less (11/12/21).     Patient states he saw his PCP in November, at that time he was having severe left flank pain and at that time he states he did have a fever. A CT renal stone study was ordered at that time; patient did not complete this. Patient states his pain did improve and fever went away. Patient states that he still has left flank pain and describes it as a \"pinching\". He denies dysuria, frequency, urgency, and gross hematuria. Denies fevers and chills. Denies nausea and vomiting.        Review of Systems   Constitutional:  Negative for activity change, chills, fatigue and fever.   HENT:  Negative for congestion, rhinorrhea and sore throat.    Eyes:  Negative for photophobia, redness and visual disturbance.   Respiratory:  Negative for cough, shortness of breath and wheezing.    Cardiovascular:  Negative for chest pain, palpitations and leg swelling.   Gastrointestinal:  Negative for abdominal pain, diarrhea, nausea and vomiting. "   Genitourinary:  Positive for flank pain. Negative for dysuria, frequency, hematuria and urgency.        Left sided   Neurological:  Negative for weakness, light-headedness and headaches.           AUA SYMPTOM SCORE      Flowsheet Row Most Recent Value   AUA SYMPTOM SCORE    How often have you had a sensation of not emptying your bladder completely after you finished urinating? 1 (P)     How often have you had to urinate again less than two hours after you finished urinating? 1 (P)     How often have you found you stopped and started again several times when you urinate? 2 (P)     How often have you found it difficult to postpone urination? 0 (P)     How often have you had a weak urinary stream? 4 (P)     How often have you had to push or strain to begin urination? 0 (P)     How many times did you most typically get up to urinate from the time you went to bed at night until the time you got up in the morning? 2 (P)     Quality of Life: If you were to spend the rest of your life with your urinary condition just the way it is now, how would you feel about that? 5 (P)     AUA SYMPTOM SCORE 10 (P)               Vitals  There were no vitals filed for this visit.    Physical Exam  Constitutional:       Appearance: Normal appearance. He is not toxic-appearing.   HENT:      Head: Normocephalic.      Mouth/Throat:      Pharynx: Oropharynx is clear.   Eyes:      Extraocular Movements: Extraocular movements intact.      Pupils: Pupils are equal, round, and reactive to light.   Pulmonary:      Effort: Pulmonary effort is normal. No respiratory distress.   Musculoskeletal:         General: Normal range of motion.      Cervical back: Normal range of motion.   Neurological:      Mental Status: He is alert and oriented to person, place, and time. Mental status is at baseline.      Gait: Gait normal.           Past History  Past Medical History:   Diagnosis Date    Anxiety     Back pain     Chronic pain disorder     back     Depression     Kidney stone     Sciatica     Wears glasses      Social History     Socioeconomic History    Marital status: Single     Spouse name: None    Number of children: None    Years of education: None    Highest education level: None   Occupational History    None   Tobacco Use    Smoking status: Former     Current packs/day: 0.00     Average packs/day: 2.0 packs/day for 20.6 years (41.2 ttl pk-yrs)     Types: Cigarettes     Start date:      Quit date: 2011     Years since quittin.4    Smokeless tobacco: Never    Tobacco comments:     quit smoking x11yrs ago   Vaping Use    Vaping status: Every Day    Start date: 2011    Substances: Nicotine, THC, CBD, Flavoring   Substance and Sexual Activity    Alcohol use: No    Drug use: Yes     Types: Marijuana     Comment: daily- vape- yesterday     Sexual activity: None   Other Topics Concern    None   Social History Narrative    None     Social Drivers of Health     Financial Resource Strain: Not on file   Food Insecurity: Not on file   Transportation Needs: Not on file   Physical Activity: Not on file   Stress: Not on file   Social Connections: Not on file   Intimate Partner Violence: Not on file   Housing Stability: Not on file     Social History     Tobacco Use   Smoking Status Former    Current packs/day: 0.00    Average packs/day: 2.0 packs/day for 20.6 years (41.2 ttl pk-yrs)    Types: Cigarettes    Start date:     Quit date: 2011    Years since quittin.4   Smokeless Tobacco Never   Tobacco Comments    quit smoking x11yrs ago     Family History   Problem Relation Age of Onset    Psychiatric Illness Mother     No Known Problems Father        The following portions of the patient's history were reviewed and updated as appropriate: allergies, current medications, past medical history, past social history, past surgical history and problem list.    Results  No results found for this or any previous visit (from the past hour).]  No  "results found for: \"PSA\"  Lab Results   Component Value Date    CALCIUM 8.9 11/12/2021    K 3.6 11/12/2021    CO2 23 11/12/2021     11/12/2021    BUN 21 11/12/2021    CREATININE 1.08 11/12/2021     Lab Results   Component Value Date    WBC 6.49 07/27/2022    HGB 15.6 07/27/2022    HCT 46.8 07/27/2022    MCV 91 07/27/2022     07/27/2022       ZOHAIB Covarrubias  "

## 2025-01-15 ENCOUNTER — HOSPITAL ENCOUNTER (OUTPATIENT)
Dept: RADIOLOGY | Facility: MEDICAL CENTER | Age: 50
Discharge: HOME | End: 2025-01-15
Payer: COMMERCIAL

## 2025-01-15 ENCOUNTER — APPOINTMENT (OUTPATIENT)
Dept: RADIOLOGY | Facility: MEDICAL CENTER | Age: 50
End: 2025-01-15
Payer: COMMERCIAL

## 2025-01-15 DIAGNOSIS — R10.9 LEFT FLANK PAIN: ICD-10-CM

## 2025-01-15 PROCEDURE — 76770 US EXAM ABDO BACK WALL COMP: CPT

## 2025-01-15 PROCEDURE — 74018 RADEX ABDOMEN 1 VIEW: CPT

## 2025-01-20 ENCOUNTER — RESULTS FOLLOW-UP (OUTPATIENT)
Dept: UROLOGY | Facility: CLINIC | Age: 50
End: 2025-01-20

## 2025-05-28 ENCOUNTER — OFFICE VISIT (OUTPATIENT)
Dept: FAMILY MEDICINE CLINIC | Facility: CLINIC | Age: 50
End: 2025-05-28
Payer: COMMERCIAL

## 2025-05-28 VITALS
RESPIRATION RATE: 16 BRPM | TEMPERATURE: 98.2 F | BODY MASS INDEX: 33.07 KG/M2 | OXYGEN SATURATION: 98 % | DIASTOLIC BLOOD PRESSURE: 76 MMHG | HEART RATE: 94 BPM | HEIGHT: 70 IN | WEIGHT: 231 LBS | SYSTOLIC BLOOD PRESSURE: 112 MMHG

## 2025-05-28 DIAGNOSIS — R05.2 SUBACUTE COUGH: Primary | ICD-10-CM

## 2025-05-28 DIAGNOSIS — F41.9 ANXIETY: ICD-10-CM

## 2025-05-28 DIAGNOSIS — R21 RASH: ICD-10-CM

## 2025-05-28 PROBLEM — F32.2 SEVERE MAJOR DEPRESSIVE DISORDER (HCC): Status: ACTIVE | Noted: 2025-05-28

## 2025-05-28 PROBLEM — R05.9 COUGH: Status: ACTIVE | Noted: 2025-05-28

## 2025-05-28 PROCEDURE — 99213 OFFICE O/P EST LOW 20 MIN: CPT | Performed by: INTERNAL MEDICINE

## 2025-05-28 RX ORDER — AZITHROMYCIN 250 MG/1
500 TABLET, FILM COATED ORAL EVERY 24 HOURS
Qty: 10 TABLET | Refills: 0 | Status: SHIPPED | OUTPATIENT
Start: 2025-05-28 | End: 2025-06-02

## 2025-05-28 RX ORDER — MOMETASONE FUROATE 1 MG/G
CREAM TOPICAL DAILY
Qty: 15 G | Refills: 1 | Status: SHIPPED | OUTPATIENT
Start: 2025-05-28

## 2025-05-28 RX ORDER — LORAZEPAM 0.5 MG/1
0.5 TABLET ORAL EVERY 8 HOURS PRN
Qty: 30 TABLET | Refills: 0 | Status: SHIPPED | OUTPATIENT
Start: 2025-05-28

## 2025-05-28 NOTE — PROGRESS NOTES
Name: Shashi Roy      : 1975      MRN: [id number]  Encounter Provider: Maribell Gambino MD  Encounter Date: 2025   Encounter department: [facility]  :  Assessment & Plan  Subacute cough  Patient complains of a dry nagging cough and a mild sore throat.    Orders:    azithromycin (ZITHROMAX) 250 mg tablet; Take 2 tablets (500 mg total) by mouth every 24 hours for 5 days    Anxiety  He again requests a small prescription of Ativan for his anxiety    Orders:    LORazepam (Ativan) 0.5 mg tablet; Take 1 tablet (0.5 mg total) by mouth every 8 (eight) hours as needed for anxiety    Rash  He also complains of itchy that he has gotten Elocon in the past for and would like another tube of it    Orders:    mometasone (ELOCON) 0.1 % cream; Apply topically daily          Tobacco Cessation Counseling: The patient is sincerely urged to quit consumption of tobacco. He is ready to quit tobacco.       History of Present Illness   URI   This is a new problem. The current episode started in the past 7 days. The problem has been waxing and waning. There has been no fever. Associated symptoms include coughing, a rash and a sore throat. Pertinent negatives include no abdominal pain, chest pain, dysuria, ear pain or vomiting. He has tried antihistamine, acetaminophen and increased fluids for the symptoms. The treatment provided no relief.     Review of Systems   Constitutional:  Negative for chills and fever.   HENT:  Positive for sore throat. Negative for ear pain.    Eyes:  Negative for pain and visual disturbance.   Respiratory:  Positive for cough. Negative for shortness of breath.    Cardiovascular:  Negative for chest pain and palpitations.   Gastrointestinal:  Negative for abdominal pain and vomiting.   Genitourinary:  Negative for dysuria and hematuria.   Musculoskeletal:  Negative for arthralgias and back pain.   Skin:  Positive for rash. Negative for color change.   Neurological:  Negative for seizures  "and syncope.   Psychiatric/Behavioral:  The patient is nervous/anxious.    All other systems reviewed and are negative.      Objective   /76 (BP Location: Left arm, Patient Position: Sitting, Cuff Size: Large)   Pulse 94   Temp 98.2 °F (36.8 °C) (Temporal)   Resp 16   Ht 5' 10\" (1.778 m)   Wt 105 kg (231 lb)   SpO2 98%   BMI 33.15 kg/m²      Physical Exam  Vitals and nursing note reviewed.   Constitutional:       General: He is not in acute distress.     Appearance: He is well-developed.   HENT:      Head: Normocephalic and atraumatic.      Right Ear: Tympanic membrane normal.      Left Ear: Tympanic membrane normal.      Nose: Congestion present.      Mouth/Throat:      Pharynx: Posterior oropharyngeal erythema present. No oropharyngeal exudate.     Eyes:      Conjunctiva/sclera: Conjunctivae normal.       Cardiovascular:      Rate and Rhythm: Normal rate and regular rhythm.      Heart sounds: No murmur heard.  Pulmonary:      Effort: Pulmonary effort is normal. No respiratory distress.      Breath sounds: Normal breath sounds.   Abdominal:      Palpations: Abdomen is soft.      Tenderness: There is no abdominal tenderness.     Musculoskeletal:         General: No swelling.      Cervical back: Neck supple.   Lymphadenopathy:      Cervical: No cervical adenopathy.     Skin:     General: Skin is warm and dry.      Capillary Refill: Capillary refill takes less than 2 seconds.     Neurological:      Mental Status: He is alert.     Psychiatric:         Mood and Affect: Mood normal.         "

## 2025-05-28 NOTE — ASSESSMENT & PLAN NOTE
Patient complains of a dry nagging cough and a mild sore throat.    Orders:    azithromycin (ZITHROMAX) 250 mg tablet; Take 2 tablets (500 mg total) by mouth every 24 hours for 5 days

## 2025-05-28 NOTE — ASSESSMENT & PLAN NOTE
He again requests a small prescription of Ativan for his anxiety    Orders:    LORazepam (Ativan) 0.5 mg tablet; Take 1 tablet (0.5 mg total) by mouth every 8 (eight) hours as needed for anxiety

## 2025-05-28 NOTE — ASSESSMENT & PLAN NOTE
He also complains of itchy that he has gotten Elocon in the past for and would like another tube of it    Orders:    mometasone (ELOCON) 0.1 % cream; Apply topically daily

## (undated) DEVICE — SCD SEQUENTIAL COMPRESSION COMFORT SLEEVE MEDIUM KNEE LENGTH: Brand: KENDALL SCD

## (undated) DEVICE — CATH URETERAL 5FR X 70 CM FLEX TIP POLYUR BARD

## (undated) DEVICE — BASKET SPECIMEN RETRIVAL 1.9FR 120CM

## (undated) DEVICE — INVIEW CLEAR LEGGINGS: Brand: CONVERTORS

## (undated) DEVICE — PACK TUR

## (undated) DEVICE — SPECIMEN CONTAINER STERILE PEEL PACK

## (undated) DEVICE — CHLORHEXIDINE 4PCT 4 OZ

## (undated) DEVICE — GUIDEWIRE STRGHT TIP 0.035 IN  SOLO PLUS

## (undated) DEVICE — SPONGE 4 X 4 XRAY 16 PLY STRL LF RFD

## (undated) DEVICE — SHEATH URETERAL ACCESS 12/14FR 45CM PROXIS

## (undated) DEVICE — UROCATCH BAG

## (undated) DEVICE — LUBRICANT SURGILUBE TUBE 4 OZ  FLIP TOP

## (undated) DEVICE — FIBER LASER HOLIUM 365 MICRON

## (undated) DEVICE — 3M™ TEGADERM™ TRANSPARENT FILM DRESSING FRAME STYLE, 1624W, 2-3/8 IN X 2-3/4 IN (6 CM X 7 CM), 100/CT 4CT/CASE: Brand: 3M™ TEGADERM™

## (undated) DEVICE — CATH FOLEY 18FR 5ML 2 WAY SILICONE ELASTIMER

## (undated) DEVICE — GLOVE SRG BIOGEL 7